# Patient Record
Sex: FEMALE | Race: BLACK OR AFRICAN AMERICAN | Employment: UNEMPLOYED | URBAN - METROPOLITAN AREA
[De-identification: names, ages, dates, MRNs, and addresses within clinical notes are randomized per-mention and may not be internally consistent; named-entity substitution may affect disease eponyms.]

---
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
## 2023-01-03 ENCOUNTER — ROUTINE PRENATAL (OUTPATIENT)
Dept: OBGYN | Age: 18
End: 2023-01-03
Payer: COMMERCIAL

## 2023-01-03 ENCOUNTER — HOSPITAL ENCOUNTER (OUTPATIENT)
Age: 18
Setting detail: SPECIMEN
Discharge: HOME | End: 2023-01-03

## 2023-01-03 VITALS
HEART RATE: 93 BPM | DIASTOLIC BLOOD PRESSURE: 70 MMHG | WEIGHT: 145 LBS | BODY MASS INDEX: 30.31 KG/M2 | SYSTOLIC BLOOD PRESSURE: 116 MMHG

## 2023-01-03 DIAGNOSIS — R82.71 GBS BACTERIURIA: ICD-10-CM

## 2023-01-03 DIAGNOSIS — Z3A.27 27 WEEKS GESTATION OF PREGNANCY: ICD-10-CM

## 2023-01-03 DIAGNOSIS — Z3A.27 27 WEEKS GESTATION OF PREGNANCY: Primary | ICD-10-CM

## 2023-01-03 LAB
ABSOLUTE EOS #: 0.12 K/UL (ref 0–0.44)
ABSOLUTE IMMATURE GRANULOCYTE: 0.03 K/UL (ref 0–0.3)
ABSOLUTE LYMPH #: 1.64 K/UL (ref 1.2–5.2)
ABSOLUTE MONO #: 0.54 K/UL (ref 0.1–1.4)
BASOPHILS # BLD: 0 % (ref 0–2)
BASOPHILS ABSOLUTE: <0.03 K/UL (ref 0–0.2)
EOSINOPHILS RELATIVE PERCENT: 2 % (ref 1–4)
GLUCOSE ADMINISTRATION: ABNORMAL
GLUCOSE TOLERANCE SCREEN 50G: 146 MG/DL (ref 70–135)
HCT VFR BLD CALC: 34.9 % (ref 36.3–47.1)
HEMOGLOBIN: 11.5 G/DL (ref 11.9–15.1)
IMMATURE GRANULOCYTES: 1 %
LYMPHOCYTES # BLD: 26 % (ref 25–45)
MCH RBC QN AUTO: 31.3 PG (ref 25.2–33.5)
MCHC RBC AUTO-ENTMCNC: 33 G/DL (ref 28.4–34.8)
MCV RBC AUTO: 94.8 FL (ref 82.6–102.9)
MONOCYTES # BLD: 8 % (ref 2–8)
NRBC AUTOMATED: 0 PER 100 WBC
PDW BLD-RTO: 12.8 % (ref 11.8–14.4)
PLATELET # BLD: 308 K/UL (ref 138–453)
PMV BLD AUTO: 9.7 FL (ref 8.1–13.5)
RBC # BLD: 3.68 M/UL (ref 3.95–5.11)
SEG NEUTROPHILS: 63 % (ref 34–64)
SEGMENTED NEUTROPHILS ABSOLUTE COUNT: 4.05 K/UL (ref 1.8–8)
WBC # BLD: 6.4 K/UL (ref 4.5–13.5)

## 2023-01-03 PROCEDURE — 99213 OFFICE O/P EST LOW 20 MIN: CPT

## 2023-01-03 PROCEDURE — 90715 TDAP VACCINE 7 YRS/> IM: CPT | Performed by: OBSTETRICS & GYNECOLOGY

## 2023-01-03 PROCEDURE — G8484 FLU IMMUNIZE NO ADMIN: HCPCS

## 2023-01-03 PROCEDURE — G8427 DOCREV CUR MEDS BY ELIG CLIN: HCPCS

## 2023-01-03 PROCEDURE — 1036F TOBACCO NON-USER: CPT

## 2023-01-03 PROCEDURE — 99211 OFF/OP EST MAY X REQ PHY/QHP: CPT

## 2023-01-03 PROCEDURE — G8419 CALC BMI OUT NRM PARAM NOF/U: HCPCS

## 2023-01-03 RX ORDER — ONDANSETRON 4 MG/1
4 TABLET, ORALLY DISINTEGRATING ORAL 3 TIMES DAILY PRN
Qty: 21 TABLET | Refills: 0 | Status: SHIPPED | OUTPATIENT
Start: 2023-01-03

## 2023-01-03 NOTE — PROGRESS NOTES
Prenatal Visit    Priyanka Dodd is a 21 y.o. female  at 27w3d    The patient was seen and evaluated. She complains of intermittent nausea. She reports positive fetal movements. She denies contractions, vaginal bleeding and leakage of fluid. Signs and symptoms of labor and pre-eclampsia were reviewed with the patient in detail. She is to report any of these if they occur. She currently denies any of these. The patient is Rh + and Rhogam is not indicated in this pregnancy. The patient is requesting the T-Dap Vaccine (27-36 weeks) this pregnancy and will receive today. The patient declined the influenza vaccine this year. The patient declined the COVID-19 vaccine this year. The problem list reflects the active issues addressed during today's visit    Vitals:  Vitals:    23 1008   BP: 116/70   Site: Right Upper Arm   Position: Sitting   Cuff Size: Medium Adult   Pulse: 93   Weight: 145 lb (65.8 kg)       BP: 116/70  Weight: 145 lb (65.8 kg)  Heart Rate: 93  Albumin: Negative  Glucose: Negative  Fundal Height (cm): 27 cm  Fetal HR: 155  Movement: Present     28 week labs: .  1hr GTT: ordered today   28 week CBC:   Lab Results   Component Value Date    WBC Unable to perform testing: No specimen received. 10/18/2022    HGB Unable to perform testing: No specimen received. 10/18/2022    HCT Unable to perform testing: No specimen received. 10/18/2022    MCV Unable to perform testing: No specimen received. 10/18/2022    PLT Unable to perform testing: No specimen received.  10/18/2022     UA w/ Ur C&S: ordered today     Assessment & Plan:  Priyanka Dodd is a 21 y.o. female  at 27w3d   - 29 week labs ordered   - Tdap vaccination: is requesting    - Influenza vaccination: declined    - Rhogam: is not indicated in this pregnancy   - COVID-19- patient declined today   -  testing indication starting 32 weeks GA:  scheduled for MFM appt 22   -Indications for  section: none at this time   - Warning signs reviewed and recommendations when to call or present to the hospital if she experiences signs or symptoms of  labor and pre-eclampsia were reviewed. - The patient was instructed on fetal kick counts. She was instructed that the baby should move at a minimum of ten times within one hour after a meal. The patient was instructed to lay down on her left side twenty minutes after eating and count movements for up to one hour with a target value of ten movements. She was instructed to notify the office if she did not make that target after two attempts or if after any attempt there was less than four movements. - Patient desire nexplanon PP for dysmenorrhea     Patient Active Problem List    Diagnosis Date Noted    Acute pyelonephritis in second trimester 10/17/2022     Priority: Medium     Culture: klebsiella pneumoniae. Prescribed Keflex 500mg four times daily x7 days, 500mg Keflex once a day for suppression  22:patient reports not taking suppression therapy. Rx re-sent to pharmacy. Repeat UCx ordered      Klebsiella pneumoniae UTI 10/17/2022     Priority: Medium     10/17/22: Pyelo. Prescribed 500mg Keflex four times daily, x7 days.       Short interval between pregnancies affecting pregnancy, antepartum 2022     G2: 22  G3: SELVIN 23      Recent former cigar smoker 2022: Patient reports she quit smoking Black and Mild cigars when she learned of pregnancy, 2022      Hx Gonorrhea/Chlamydia 2022     Chlamydia  18: positive, treated with IV antibiotic    10/29/19: positive  20: negative    21: positive  21: negative  10/17/22: negative    Gonorrhea  20: positive  02/15/21: negative  10/17/22: negative      Pregravid BMI 25.23 2022: Recommended weight gain of 15 to 25 pounds; patient verbalized understanding      High risk pregnancy, antepartum 2022     Fetal testing indicated: No  Fetal testing indication: N/A  Fetal testing initiation: N/A  Fetal testing frequency: N/A      Dilatation of pulmonic artery       1/12/22: Patient was admitted to the hospital for positive COVID with chest pain and SOB              - Work up at that time showed dilation of pulmonary artery on CT, however WINSTON could not be done due to positive COVID               - Echo performed showing EF 53%, no significant valvular regurgitation or stenosis seen, pulmonic valve not well visualized but doppler velocities normal              - Cardiology recommended outpatient follow up with WINSTON after COVID resolved              - Patient reports being unaware of diagnosis and has not followed up with cardiology              - VSS              - Denies any SOB or chest pain at this time    Patient was to have followed up with cardiology after delivery; no followup noted in chart      Hx gHTN (G1) 11/22/2021    Hx FGR (G1, G2) 11/22/2021     G1: IoL IUGR  G2: IoL IUGR 38w4d      Late prenatal care 10/11/2021     G3: 20w4d at OB intake      Family H/O CHD 06/11/2018     Two half  sisters  Same mother but different fathers      THC use  02/13/2018     10/17/22: UDS positive. 11/16/22: Patient reports smoking marijuana \"every other day. \" Patient was counseled not recommended in pregnancy; maternal/fetal risks reviewed. Patient verbalizes understanding. Return in about 4 weeks (around 1/31/2023) for Routine OB Visit. Rashid Ontiveros MD  Ob/Gyn Resident  [facility], [place]  1/3/2023, 10:32 AM        Attending Physician Statement  I have personally discussed the care of Mica Lopez, including pertinent history and exam findings with the resident. I have reviewed and edited their note in the electronic medical record as indicated. The key elements of all parts of the encounter have been performed/reviewed by me.   I agree with the assessment, plan and orders as documented by the resident.        Attending's Name:  Shweta Armendariz MD

## 2023-01-04 PROBLEM — R82.71 GBS BACTERIURIA: Status: ACTIVE | Noted: 2023-01-04

## 2023-01-04 LAB
CULTURE: ABNORMAL
SPECIMEN DESCRIPTION: ABNORMAL

## 2023-01-05 DIAGNOSIS — Z3A.27 27 WEEKS GESTATION OF PREGNANCY: Primary | ICD-10-CM

## 2023-01-05 PROBLEM — R73.09 GLUCOSE TOLERANCE TEST ABNORMAL: Status: ACTIVE | Noted: 2023-01-05

## 2023-01-25 ENCOUNTER — APPOINTMENT (OUTPATIENT)
Dept: CT IMAGING | Age: 18
End: 2023-01-25
Payer: COMMERCIAL

## 2023-01-25 ENCOUNTER — HOSPITAL ENCOUNTER (OUTPATIENT)
Age: 18
Discharge: HOME | End: 2023-01-25
Attending: EMERGENCY MEDICINE | Admitting: OBSTETRICS & GYNECOLOGY
Payer: COMMERCIAL

## 2023-01-25 VITALS
SYSTOLIC BLOOD PRESSURE: 118 MMHG | BODY MASS INDEX: 27.29 KG/M2 | DIASTOLIC BLOOD PRESSURE: 67 MMHG | OXYGEN SATURATION: 100 % | RESPIRATION RATE: 16 BRPM | TEMPERATURE: 98.1 F | WEIGHT: 130 LBS | HEART RATE: 73 BPM | HEIGHT: 58 IN
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
## 2023-01-25 DIAGNOSIS — Z3A.30 30 WEEKS GESTATION OF PREGNANCY: ICD-10-CM

## 2023-01-25 LAB
ANION GAP SERPL CALCULATED.3IONS-SCNC: 12 MMOL/L (ref 9–17)
BUN BLDV-MCNC: 7 MG/DL (ref 6–20)
CALCIUM SERPL-MCNC: 8.9 MG/DL (ref 8.6–10.4)
CHLORIDE BLD-SCNC: 104 MMOL/L (ref 98–107)
CO2: 18 MMOL/L (ref 20–31)
CREAT SERPL-MCNC: 0.48 MG/DL (ref 0.5–0.9)
GFR SERPL CREATININE-BSD FRML MDRD: >60 ML/MIN/1.73M2
GLUCOSE BLD-MCNC: 72 MG/DL (ref 70–99)
POTASSIUM SERPL-SCNC: 3.6 MMOL/L (ref 3.7–5.3)
SODIUM BLD-SCNC: 134 MMOL/L (ref 135–144)

## 2023-01-25 PROCEDURE — 70486 CT MAXILLOFACIAL W/O DYE: CPT

## 2023-01-25 PROCEDURE — 70498 CT ANGIOGRAPHY NECK: CPT

## 2023-01-25 PROCEDURE — 99213 OFFICE O/P EST LOW 20 MIN: CPT

## 2023-01-25 PROCEDURE — G0378 HOSPITAL OBSERVATION PER HR: HCPCS

## 2023-01-25 PROCEDURE — 80048 BASIC METABOLIC PNL TOTAL CA: CPT

## 2023-01-25 PROCEDURE — 99285 EMERGENCY DEPT VISIT HI MDM: CPT

## 2023-01-25 PROCEDURE — 6360000004 HC RX CONTRAST MEDICATION: Performed by: STUDENT IN AN ORGANIZED HEALTH CARE EDUCATION/TRAINING PROGRAM

## 2023-01-25 PROCEDURE — 6370000000 HC RX 637 (ALT 250 FOR IP): Performed by: STUDENT IN AN ORGANIZED HEALTH CARE EDUCATION/TRAINING PROGRAM

## 2023-01-25 RX ORDER — ACETAMINOPHEN 500 MG
1000 TABLET ORAL ONCE
Status: COMPLETED | OUTPATIENT
Start: 2023-01-25 | End: 2023-01-25

## 2023-01-25 RX ADMIN — IOPAMIDOL 90 ML: 755 INJECTION, SOLUTION INTRAVENOUS at 19:26

## 2023-01-25 RX ADMIN — ACETAMINOPHEN 1000 MG: 500 TABLET ORAL at 18:32

## 2023-01-25 ASSESSMENT — ENCOUNTER SYMPTOMS
WHEEZING: 0
ABDOMINAL PAIN: 1
CHEST TIGHTNESS: 0
VOMITING: 0
COUGH: 0
NAUSEA: 0
SHORTNESS OF BREATH: 0
DIARRHEA: 0

## 2023-01-25 ASSESSMENT — PAIN DESCRIPTION - ORIENTATION: ORIENTATION: LEFT

## 2023-01-25 ASSESSMENT — PAIN SCALES - GENERAL
PAINLEVEL_OUTOF10: 6
PAINLEVEL_OUTOF10: 10

## 2023-01-25 ASSESSMENT — PAIN - FUNCTIONAL ASSESSMENT: PAIN_FUNCTIONAL_ASSESSMENT: 0-10

## 2023-01-25 ASSESSMENT — PAIN DESCRIPTION - LOCATION: LOCATION: HEAD
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
## 2023-01-26 NOTE — FLOWSHEET NOTE
Pt okay for discharge per Dr Wanda Ortiz. Discharge instructions given, all questions answered. Pt d/c via self with all belongings.
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
## 2023-01-31 ENCOUNTER — ROUTINE PRENATAL (OUTPATIENT)
Dept: PERINATAL CARE | Age: 18
End: 2023-01-31
Payer: COMMERCIAL

## 2023-01-31 ENCOUNTER — ROUTINE PRENATAL (OUTPATIENT)
Dept: OBGYN | Age: 18
End: 2023-01-31
Payer: COMMERCIAL

## 2023-01-31 VITALS
BODY MASS INDEX: 30.02 KG/M2 | RESPIRATION RATE: 16 BRPM | SYSTOLIC BLOOD PRESSURE: 112 MMHG | DIASTOLIC BLOOD PRESSURE: 72 MMHG | WEIGHT: 143 LBS | HEIGHT: 58 IN | TEMPERATURE: 97.2 F | HEART RATE: 84 BPM

## 2023-01-31 VITALS
HEART RATE: 87 BPM | DIASTOLIC BLOOD PRESSURE: 67 MMHG | BODY MASS INDEX: 30.1 KG/M2 | WEIGHT: 144 LBS | SYSTOLIC BLOOD PRESSURE: 108 MMHG

## 2023-01-31 DIAGNOSIS — Z3A.31 31 WEEKS GESTATION OF PREGNANCY: Primary | ICD-10-CM

## 2023-01-31 DIAGNOSIS — Z87.59 HISTORY OF GESTATIONAL HYPERTENSION: ICD-10-CM

## 2023-01-31 DIAGNOSIS — O99.810 ABNORMAL MATERNAL GLUCOSE TOLERANCE, ANTEPARTUM: ICD-10-CM

## 2023-01-31 DIAGNOSIS — Z36.4 ULTRASOUND FOR ANTENATAL SCREENING FOR FETAL GROWTH RESTRICTION: ICD-10-CM

## 2023-01-31 DIAGNOSIS — R82.71 GBS BACTERIURIA: ICD-10-CM

## 2023-01-31 DIAGNOSIS — R73.09 GLUCOSE TOLERANCE TEST ABNORMAL: ICD-10-CM

## 2023-01-31 DIAGNOSIS — O36.5930 INTRAUTERINE GROWTH RESTRICTION (IUGR) AFFECTING CARE OF MOTHER, THIRD TRIMESTER, SINGLE GESTATION: ICD-10-CM

## 2023-01-31 DIAGNOSIS — Z13.89 ENCOUNTER FOR ROUTINE SCREENING FOR MALFORMATION USING ULTRASONICS: ICD-10-CM

## 2023-01-31 DIAGNOSIS — O09.33 INSUFFICIENT PRENATAL CARE IN THIRD TRIMESTER: ICD-10-CM

## 2023-01-31 DIAGNOSIS — I28.8 DILATATION OF PULMONIC ARTERY (HCC): ICD-10-CM

## 2023-01-31 DIAGNOSIS — O09.899 SHORT INTERVAL BETWEEN PREGNANCIES COMPLICATING PREGNANCY, ANTEPARTUM: ICD-10-CM

## 2023-01-31 DIAGNOSIS — O35.2XX0 HEREDITARY FAMILIAL DISEASE AFFECTING MANAGEMENT OF MOTHER AND POSSIBLY AFFECTING FETUS, ANTEPARTUM, SINGLE OR UNSPECIFIED FETUS: Primary | ICD-10-CM

## 2023-01-31 DIAGNOSIS — O09.293 HISTORY OF INTRAUTERINE GROWTH RESTRICTION IN PRIOR PREGNANCY, CURRENTLY PREGNANT, THIRD TRIMESTER: ICD-10-CM

## 2023-01-31 DIAGNOSIS — Z3A.31 31 WEEKS GESTATION OF PREGNANCY: ICD-10-CM

## 2023-01-31 PROBLEM — O36.5990 IUGR (INTRAUTERINE GROWTH RESTRICTION) AFFECTING CARE OF MOTHER: Status: RESOLVED | Noted: 2022-02-22 | Resolved: 2023-01-31

## 2023-01-31 PROCEDURE — 93325 DOPPLER ECHO COLOR FLOW MAPG: CPT | Performed by: OBSTETRICS & GYNECOLOGY

## 2023-01-31 PROCEDURE — 99211 OFF/OP EST MAY X REQ PHY/QHP: CPT

## 2023-01-31 PROCEDURE — G8427 DOCREV CUR MEDS BY ELIG CLIN: HCPCS

## 2023-01-31 PROCEDURE — 76820 UMBILICAL ARTERY ECHO: CPT | Performed by: OBSTETRICS & GYNECOLOGY

## 2023-01-31 PROCEDURE — 76819 FETAL BIOPHYS PROFIL W/O NST: CPT | Performed by: OBSTETRICS & GYNECOLOGY

## 2023-01-31 PROCEDURE — 76821 MIDDLE CEREBRAL ARTERY ECHO: CPT | Performed by: OBSTETRICS & GYNECOLOGY

## 2023-01-31 PROCEDURE — 76805 OB US >/= 14 WKS SNGL FETUS: CPT | Performed by: OBSTETRICS & GYNECOLOGY

## 2023-01-31 PROCEDURE — 76827 ECHO EXAM OF FETAL HEART: CPT | Performed by: OBSTETRICS & GYNECOLOGY

## 2023-01-31 PROCEDURE — G8484 FLU IMMUNIZE NO ADMIN: HCPCS

## 2023-01-31 PROCEDURE — 1036F TOBACCO NON-USER: CPT

## 2023-01-31 PROCEDURE — 99213 OFFICE O/P EST LOW 20 MIN: CPT

## 2023-01-31 PROCEDURE — G8419 CALC BMI OUT NRM PARAM NOF/U: HCPCS

## 2023-01-31 PROCEDURE — 76825 ECHO EXAM OF FETAL HEART: CPT | Performed by: OBSTETRICS & GYNECOLOGY

## 2023-01-31 PROCEDURE — 99999 PR OFFICE/OUTPT VISIT,PROCEDURE ONLY: CPT | Performed by: OBSTETRICS & GYNECOLOGY

## 2023-01-31 RX ORDER — ONDANSETRON 4 MG/1
4 TABLET, FILM COATED ORAL 3 TIMES DAILY PRN
Qty: 270 TABLET | Refills: 0 | Status: SHIPPED | OUTPATIENT
Start: 2023-01-31 | End: 2023-05-01
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
## 2023-02-14 ENCOUNTER — TELEPHONE (OUTPATIENT)
Dept: OBGYN | Age: 18
End: 2023-02-14

## 2023-02-14 NOTE — TELEPHONE ENCOUNTER
Patient no showed for 2/14/2023 OB appointment at [address] office. Writer unable to leave a voice message for patient to call the office to reschedule appointment.

## 2023-02-22 ENCOUNTER — ROUTINE PRENATAL (OUTPATIENT)
Dept: PERINATAL CARE | Age: 18
End: 2023-02-22
Payer: COMMERCIAL

## 2023-02-22 VITALS
TEMPERATURE: 97.9 F | DIASTOLIC BLOOD PRESSURE: 63 MMHG | RESPIRATION RATE: 16 BRPM | SYSTOLIC BLOOD PRESSURE: 121 MMHG | HEART RATE: 87 BPM | WEIGHT: 142 LBS | BODY MASS INDEX: 29.81 KG/M2 | HEIGHT: 58 IN

## 2023-02-22 DIAGNOSIS — O36.5930 INTRAUTERINE GROWTH RESTRICTION (IUGR) AFFECTING CARE OF MOTHER, THIRD TRIMESTER, SINGLE GESTATION: Primary | ICD-10-CM

## 2023-02-22 DIAGNOSIS — O09.899 SHORT INTERVAL BETWEEN PREGNANCIES COMPLICATING PREGNANCY, ANTEPARTUM: ICD-10-CM

## 2023-02-22 DIAGNOSIS — Z36.4 ULTRASOUND FOR ANTENATAL SCREENING FOR FETAL GROWTH RESTRICTION: ICD-10-CM

## 2023-02-22 DIAGNOSIS — Z3A.34 34 WEEKS GESTATION OF PREGNANCY: ICD-10-CM

## 2023-02-22 DIAGNOSIS — O09.293 HISTORY OF INTRAUTERINE GROWTH RESTRICTION IN PRIOR PREGNANCY, CURRENTLY PREGNANT, THIRD TRIMESTER: ICD-10-CM

## 2023-02-22 DIAGNOSIS — O99.810 ABNORMAL MATERNAL GLUCOSE TOLERANCE, ANTEPARTUM: ICD-10-CM

## 2023-02-22 DIAGNOSIS — O09.33 INSUFFICIENT PRENATAL CARE IN THIRD TRIMESTER: ICD-10-CM

## 2023-02-22 PROCEDURE — 76819 FETAL BIOPHYS PROFIL W/O NST: CPT | Performed by: OBSTETRICS & GYNECOLOGY

## 2023-02-22 PROCEDURE — 76821 MIDDLE CEREBRAL ARTERY ECHO: CPT | Performed by: OBSTETRICS & GYNECOLOGY

## 2023-02-22 PROCEDURE — 76820 UMBILICAL ARTERY ECHO: CPT | Performed by: OBSTETRICS & GYNECOLOGY

## 2023-02-22 PROCEDURE — 76816 OB US FOLLOW-UP PER FETUS: CPT | Performed by: OBSTETRICS & GYNECOLOGY

## 2023-03-01 ENCOUNTER — ROUTINE PRENATAL (OUTPATIENT)
Dept: PERINATAL CARE | Age: 18
End: 2023-03-01
Payer: COMMERCIAL

## 2023-03-01 VITALS
SYSTOLIC BLOOD PRESSURE: 110 MMHG | HEART RATE: 93 BPM | BODY MASS INDEX: 30.23 KG/M2 | WEIGHT: 144 LBS | DIASTOLIC BLOOD PRESSURE: 62 MMHG | HEIGHT: 58 IN

## 2023-03-01 DIAGNOSIS — O99.810 ABNORMAL MATERNAL GLUCOSE TOLERANCE, ANTEPARTUM: ICD-10-CM

## 2023-03-01 DIAGNOSIS — O36.5930 INTRAUTERINE GROWTH RESTRICTION (IUGR) AFFECTING CARE OF MOTHER, THIRD TRIMESTER, SINGLE GESTATION: Primary | ICD-10-CM

## 2023-03-01 DIAGNOSIS — O43.103 PLACENTAL ABNORMALITY IN THIRD TRIMESTER: ICD-10-CM

## 2023-03-01 DIAGNOSIS — O09.899 SHORT INTERVAL BETWEEN PREGNANCIES COMPLICATING PREGNANCY, ANTEPARTUM: ICD-10-CM

## 2023-03-01 DIAGNOSIS — Z3A.35 35 WEEKS GESTATION OF PREGNANCY: ICD-10-CM

## 2023-03-01 DIAGNOSIS — O09.33 INSUFFICIENT PRENATAL CARE IN THIRD TRIMESTER: ICD-10-CM

## 2023-03-01 PROCEDURE — 76821 MIDDLE CEREBRAL ARTERY ECHO: CPT | Performed by: OBSTETRICS & GYNECOLOGY

## 2023-03-01 PROCEDURE — 99999 PR OFFICE/OUTPT VISIT,PROCEDURE ONLY: CPT | Performed by: OBSTETRICS & GYNECOLOGY

## 2023-03-01 PROCEDURE — 76819 FETAL BIOPHYS PROFIL W/O NST: CPT | Performed by: OBSTETRICS & GYNECOLOGY

## 2023-03-01 PROCEDURE — 76815 OB US LIMITED FETUS(S): CPT | Performed by: OBSTETRICS & GYNECOLOGY

## 2023-03-01 PROCEDURE — 76820 UMBILICAL ARTERY ECHO: CPT | Performed by: OBSTETRICS & GYNECOLOGY

## 2023-03-13 ENCOUNTER — HOSPITAL ENCOUNTER (INPATIENT)
Age: 18
LOS: 2 days | Discharge: HOME | End: 2023-03-15
Attending: OBSTETRICS & GYNECOLOGY | Admitting: OBSTETRICS & GYNECOLOGY
Payer: COMMERCIAL

## 2023-03-13 ENCOUNTER — ANESTHESIA (OUTPATIENT)
Dept: LABOR AND DELIVERY | Age: 18
End: 2023-03-13
Payer: COMMERCIAL

## 2023-03-13 ENCOUNTER — ANESTHESIA EVENT (OUTPATIENT)
Dept: LABOR AND DELIVERY | Age: 18
End: 2023-03-13
Payer: COMMERCIAL

## 2023-03-13 ENCOUNTER — ROUTINE PRENATAL (OUTPATIENT)
Dept: PERINATAL CARE | Age: 18
End: 2023-03-13
Payer: COMMERCIAL

## 2023-03-13 VITALS
DIASTOLIC BLOOD PRESSURE: 68 MMHG | WEIGHT: 148 LBS | TEMPERATURE: 97.8 F | HEART RATE: 88 BPM | HEIGHT: 58 IN | SYSTOLIC BLOOD PRESSURE: 112 MMHG | BODY MASS INDEX: 31.07 KG/M2 | RESPIRATION RATE: 16 BRPM

## 2023-03-13 DIAGNOSIS — O09.899 SHORT INTERVAL BETWEEN PREGNANCIES COMPLICATING PREGNANCY, ANTEPARTUM: ICD-10-CM

## 2023-03-13 DIAGNOSIS — O36.5930 INTRAUTERINE GROWTH RESTRICTION (IUGR) AFFECTING CARE OF MOTHER, THIRD TRIMESTER, SINGLE GESTATION: Primary | ICD-10-CM

## 2023-03-13 DIAGNOSIS — O09.33 INSUFFICIENT PRENATAL CARE IN THIRD TRIMESTER: ICD-10-CM

## 2023-03-13 DIAGNOSIS — O99.810 ABNORMAL MATERNAL GLUCOSE TOLERANCE, ANTEPARTUM: ICD-10-CM

## 2023-03-13 DIAGNOSIS — Z3A.37 37 WEEKS GESTATION OF PREGNANCY: ICD-10-CM

## 2023-03-13 DIAGNOSIS — Z36.4 ULTRASOUND FOR ANTENATAL SCREENING FOR FETAL GROWTH RESTRICTION: ICD-10-CM

## 2023-03-13 PROBLEM — Z97.5 IUD (INTRAUTERINE DEVICE) IN PLACE: Status: ACTIVE | Noted: 2023-03-13

## 2023-03-13 LAB
ABDOMINAL CIRCUMFERENCE: NORMAL
ABO/RH: NORMAL
ABSOLUTE EOS #: 0.14 K/UL (ref 0–0.44)
ABSOLUTE IMMATURE GRANULOCYTE: 0.06 K/UL (ref 0–0.3)
ABSOLUTE LYMPH #: 1.89 K/UL (ref 1.2–5.2)
ABSOLUTE MONO #: 0.65 K/UL (ref 0.1–1.4)
ANTIBODY SCREEN: NEGATIVE
ARM BAND NUMBER: NORMAL
BASOPHILS # BLD: 1 % (ref 0–2)
BASOPHILS ABSOLUTE: 0.04 K/UL (ref 0–0.2)
BIPARIETAL DIAMETER: NORMAL
EOSINOPHILS RELATIVE PERCENT: 2 % (ref 1–4)
ESTIMATED FETAL WEIGHT: NORMAL
EXPIRATION DATE: NORMAL
FEMORAL DIAMETER: NORMAL
HC/AC: NORMAL
HCT VFR BLD AUTO: 34.7 % (ref 36.3–47.1)
HEAD CIRCUMFERENCE: NORMAL
HGB BLD-MCNC: 11.7 G/DL (ref 11.9–15.1)
IMMATURE GRANULOCYTES: 1 %
LYMPHOCYTES # BLD: 27 % (ref 25–45)
MCH RBC QN AUTO: 30.5 PG (ref 25.2–33.5)
MCHC RBC AUTO-ENTMCNC: 33.7 G/DL (ref 28.4–34.8)
MCV RBC AUTO: 90.6 FL (ref 82.6–102.9)
MONOCYTES # BLD: 9 % (ref 2–8)
NRBC AUTOMATED: 0 PER 100 WBC
PDW BLD-RTO: 12.6 % (ref 11.8–14.4)
PLATELET # BLD AUTO: 282 K/UL (ref 138–453)
PMV BLD AUTO: 9 FL (ref 8.1–13.5)
RBC # BLD: 3.83 M/UL (ref 3.95–5.11)
SEG NEUTROPHILS: 60 % (ref 34–64)
SEGMENTED NEUTROPHILS ABSOLUTE COUNT: 4.13 K/UL (ref 1.8–8)
T PALLIDUM AB SER QL IA: NONREACTIVE
WBC # BLD AUTO: 6.9 K/UL (ref 4.5–13.5)

## 2023-03-13 PROCEDURE — 85025 COMPLETE CBC W/AUTO DIFF WBC: CPT

## 2023-03-13 PROCEDURE — 76821 MIDDLE CEREBRAL ARTERY ECHO: CPT | Performed by: OBSTETRICS & GYNECOLOGY

## 2023-03-13 PROCEDURE — 6360000002 HC RX W HCPCS

## 2023-03-13 PROCEDURE — 99245 OFF/OP CONSLTJ NEW/EST HI 55: CPT | Performed by: OBSTETRICS & GYNECOLOGY

## 2023-03-13 PROCEDURE — 2500000003 HC RX 250 WO HCPCS: Performed by: NURSE ANESTHETIST, CERTIFIED REGISTERED

## 2023-03-13 PROCEDURE — 6370000000 HC RX 637 (ALT 250 FOR IP): Performed by: STUDENT IN AN ORGANIZED HEALTH CARE EDUCATION/TRAINING PROGRAM

## 2023-03-13 PROCEDURE — 0UH97HZ INSERTION OF CONTRACEPTIVE DEVICE INTO UTERUS, VIA NATURAL OR ARTIFICIAL OPENING: ICD-10-PCS | Performed by: OBSTETRICS & GYNECOLOGY

## 2023-03-13 PROCEDURE — 86901 BLOOD TYPING SEROLOGIC RH(D): CPT

## 2023-03-13 PROCEDURE — 86780 TREPONEMA PALLIDUM: CPT

## 2023-03-13 PROCEDURE — 3700000025 EPIDURAL BLOCK: Performed by: ANESTHESIOLOGY

## 2023-03-13 PROCEDURE — G8427 DOCREV CUR MEDS BY ELIG CLIN: HCPCS | Performed by: OBSTETRICS & GYNECOLOGY

## 2023-03-13 PROCEDURE — G8484 FLU IMMUNIZE NO ADMIN: HCPCS | Performed by: OBSTETRICS & GYNECOLOGY

## 2023-03-13 PROCEDURE — 76816 OB US FOLLOW-UP PER FETUS: CPT | Performed by: OBSTETRICS & GYNECOLOGY

## 2023-03-13 PROCEDURE — 86900 BLOOD TYPING SEROLOGIC ABO: CPT

## 2023-03-13 PROCEDURE — 51701 INSERT BLADDER CATHETER: CPT

## 2023-03-13 PROCEDURE — 1220000000 HC SEMI PRIVATE OB R&B

## 2023-03-13 PROCEDURE — 6360000002 HC RX W HCPCS: Performed by: STUDENT IN AN ORGANIZED HEALTH CARE EDUCATION/TRAINING PROGRAM

## 2023-03-13 PROCEDURE — 6360000002 HC RX W HCPCS: Performed by: ANESTHESIOLOGY

## 2023-03-13 PROCEDURE — 6370000000 HC RX 637 (ALT 250 FOR IP)

## 2023-03-13 PROCEDURE — 2580000003 HC RX 258

## 2023-03-13 PROCEDURE — 76820 UMBILICAL ARTERY ECHO: CPT | Performed by: OBSTETRICS & GYNECOLOGY

## 2023-03-13 PROCEDURE — G8419 CALC BMI OUT NRM PARAM NOF/U: HCPCS | Performed by: OBSTETRICS & GYNECOLOGY

## 2023-03-13 PROCEDURE — 6360000002 HC RX W HCPCS: Performed by: NURSE ANESTHETIST, CERTIFIED REGISTERED

## 2023-03-13 PROCEDURE — 86850 RBC ANTIBODY SCREEN: CPT

## 2023-03-13 PROCEDURE — 76819 FETAL BIOPHYS PROFIL W/O NST: CPT | Performed by: OBSTETRICS & GYNECOLOGY

## 2023-03-13 PROCEDURE — 7200000001 HC VAGINAL DELIVERY

## 2023-03-13 RX ORDER — SENNA AND DOCUSATE SODIUM 50; 8.6 MG/1; MG/1
1 TABLET, FILM COATED ORAL DAILY
Qty: 30 TABLET | Refills: 0 | Status: SHIPPED | OUTPATIENT
Start: 2023-03-13

## 2023-03-13 RX ORDER — SODIUM CHLORIDE 9 MG/ML
25 INJECTION, SOLUTION INTRAVENOUS PRN
Status: DISCONTINUED | OUTPATIENT
Start: 2023-03-13 | End: 2023-03-13

## 2023-03-13 RX ORDER — DOCUSATE SODIUM 100 MG/1
100 CAPSULE, LIQUID FILLED ORAL 2 TIMES DAILY
Status: DISCONTINUED | OUTPATIENT
Start: 2023-03-13 | End: 2023-03-15 | Stop reason: HOSPADM

## 2023-03-13 RX ORDER — SODIUM CHLORIDE 9 MG/ML
INJECTION, SOLUTION INTRAVENOUS PRN
Status: DISCONTINUED | OUTPATIENT
Start: 2023-03-13 | End: 2023-03-15 | Stop reason: HOSPADM

## 2023-03-13 RX ORDER — DIPHENHYDRAMINE HCL 25 MG
25 TABLET ORAL EVERY 4 HOURS PRN
Status: DISCONTINUED | OUTPATIENT
Start: 2023-03-13 | End: 2023-03-13

## 2023-03-13 RX ORDER — ONDANSETRON 2 MG/ML
4 INJECTION INTRAMUSCULAR; INTRAVENOUS EVERY 4 HOURS PRN
Status: DISCONTINUED | OUTPATIENT
Start: 2023-03-13 | End: 2023-03-15 | Stop reason: HOSPADM

## 2023-03-13 RX ORDER — IBUPROFEN 600 MG/1
600 TABLET ORAL EVERY 6 HOURS PRN
Qty: 30 TABLET | Refills: 1 | Status: SHIPPED | OUTPATIENT
Start: 2023-03-13

## 2023-03-13 RX ORDER — PROCHLORPERAZINE EDISYLATE 5 MG/ML
10 INJECTION INTRAMUSCULAR; INTRAVENOUS ONCE
Status: COMPLETED | OUTPATIENT
Start: 2023-03-13 | End: 2023-03-13

## 2023-03-13 RX ORDER — SODIUM CHLORIDE 0.9 % (FLUSH) 0.9 %
5-40 SYRINGE (ML) INJECTION EVERY 12 HOURS SCHEDULED
Status: DISCONTINUED | OUTPATIENT
Start: 2023-03-13 | End: 2023-03-13

## 2023-03-13 RX ORDER — SODIUM CHLORIDE, SODIUM LACTATE, POTASSIUM CHLORIDE, CALCIUM CHLORIDE 600; 310; 30; 20 MG/100ML; MG/100ML; MG/100ML; MG/100ML
INJECTION, SOLUTION INTRAVENOUS CONTINUOUS
Status: DISCONTINUED | OUTPATIENT
Start: 2023-03-13 | End: 2023-03-13

## 2023-03-13 RX ORDER — BISACODYL 10 MG
10 SUPPOSITORY, RECTAL RECTAL DAILY PRN
Status: DISCONTINUED | OUTPATIENT
Start: 2023-03-13 | End: 2023-03-15 | Stop reason: HOSPADM

## 2023-03-13 RX ORDER — SODIUM CHLORIDE, SODIUM LACTATE, POTASSIUM CHLORIDE, AND CALCIUM CHLORIDE .6; .31; .03; .02 G/100ML; G/100ML; G/100ML; G/100ML
1000 INJECTION, SOLUTION INTRAVENOUS PRN
Status: DISCONTINUED | OUTPATIENT
Start: 2023-03-13 | End: 2023-03-13

## 2023-03-13 RX ORDER — ACETAMINOPHEN 500 MG
1000 TABLET ORAL EVERY 6 HOURS
Status: DISCONTINUED | OUTPATIENT
Start: 2023-03-13 | End: 2023-03-15 | Stop reason: HOSPADM

## 2023-03-13 RX ORDER — ACETAMINOPHEN 500 MG
1000 TABLET ORAL EVERY 6 HOURS PRN
Status: DISCONTINUED | OUTPATIENT
Start: 2023-03-13 | End: 2023-03-13

## 2023-03-13 RX ORDER — SODIUM CHLORIDE, SODIUM LACTATE, POTASSIUM CHLORIDE, AND CALCIUM CHLORIDE .6; .31; .03; .02 G/100ML; G/100ML; G/100ML; G/100ML
500 INJECTION, SOLUTION INTRAVENOUS PRN
Status: DISCONTINUED | OUTPATIENT
Start: 2023-03-13 | End: 2023-03-13

## 2023-03-13 RX ORDER — CEPHALEXIN 500 MG/1
500 CAPSULE ORAL DAILY
Status: DISCONTINUED | OUTPATIENT
Start: 2023-03-13 | End: 2023-03-13

## 2023-03-13 RX ORDER — LIDOCAINE HYDROCHLORIDE AND EPINEPHRINE 15; 5 MG/ML; UG/ML
INJECTION, SOLUTION EPIDURAL PRN
Status: DISCONTINUED | OUTPATIENT
Start: 2023-03-13 | End: 2023-03-13 | Stop reason: SDUPTHER

## 2023-03-13 RX ORDER — NALOXONE HYDROCHLORIDE 0.4 MG/ML
INJECTION, SOLUTION INTRAMUSCULAR; INTRAVENOUS; SUBCUTANEOUS PRN
Status: DISCONTINUED | OUTPATIENT
Start: 2023-03-13 | End: 2023-03-13

## 2023-03-13 RX ORDER — ROPIVACAINE HYDROCHLORIDE 2 MG/ML
INJECTION, SOLUTION EPIDURAL; INFILTRATION; PERINEURAL
Status: COMPLETED
Start: 2023-03-13 | End: 2023-03-13

## 2023-03-13 RX ORDER — LIDOCAINE HYDROCHLORIDE 10 MG/ML
INJECTION, SOLUTION EPIDURAL; INFILTRATION; INTRACAUDAL; PERINEURAL PRN
Status: DISCONTINUED | OUTPATIENT
Start: 2023-03-13 | End: 2023-03-13 | Stop reason: SDUPTHER

## 2023-03-13 RX ORDER — SIMETHICONE 80 MG
80 TABLET,CHEWABLE ORAL EVERY 6 HOURS PRN
Status: DISCONTINUED | OUTPATIENT
Start: 2023-03-13 | End: 2023-03-15 | Stop reason: HOSPADM

## 2023-03-13 RX ORDER — IBUPROFEN 600 MG/1
600 TABLET ORAL EVERY 6 HOURS
Status: DISCONTINUED | OUTPATIENT
Start: 2023-03-13 | End: 2023-03-15 | Stop reason: HOSPADM

## 2023-03-13 RX ORDER — ROPIVACAINE HYDROCHLORIDE 2 MG/ML
INJECTION, SOLUTION EPIDURAL; INFILTRATION; PERINEURAL PRN
Status: DISCONTINUED | OUTPATIENT
Start: 2023-03-13 | End: 2023-03-13 | Stop reason: SDUPTHER

## 2023-03-13 RX ORDER — LANOLIN 72 %
OINTMENT (GRAM) TOPICAL PRN
Status: DISCONTINUED | OUTPATIENT
Start: 2023-03-13 | End: 2023-03-15 | Stop reason: HOSPADM

## 2023-03-13 RX ORDER — ONDANSETRON 2 MG/ML
4 INJECTION INTRAMUSCULAR; INTRAVENOUS EVERY 6 HOURS PRN
Status: DISCONTINUED | OUTPATIENT
Start: 2023-03-13 | End: 2023-03-13

## 2023-03-13 RX ORDER — SODIUM CHLORIDE 0.9 % (FLUSH) 0.9 %
5-40 SYRINGE (ML) INJECTION EVERY 12 HOURS SCHEDULED
Status: DISCONTINUED | OUTPATIENT
Start: 2023-03-13 | End: 2023-03-15 | Stop reason: HOSPADM

## 2023-03-13 RX ORDER — MORPHINE SULFATE 4 MG/ML
4 INJECTION, SOLUTION INTRAMUSCULAR; INTRAVENOUS ONCE
Status: COMPLETED | OUTPATIENT
Start: 2023-03-13 | End: 2023-03-13

## 2023-03-13 RX ORDER — MORPHINE SULFATE 10 MG/ML
6 INJECTION, SOLUTION INTRAMUSCULAR; INTRAVENOUS ONCE
Status: COMPLETED | OUTPATIENT
Start: 2023-03-13 | End: 2023-03-13

## 2023-03-13 RX ORDER — SODIUM CHLORIDE 0.9 % (FLUSH) 0.9 %
5-40 SYRINGE (ML) INJECTION PRN
Status: DISCONTINUED | OUTPATIENT
Start: 2023-03-13 | End: 2023-03-13

## 2023-03-13 RX ORDER — SODIUM CHLORIDE 0.9 % (FLUSH) 0.9 %
5-40 SYRINGE (ML) INJECTION PRN
Status: DISCONTINUED | OUTPATIENT
Start: 2023-03-13 | End: 2023-03-15 | Stop reason: HOSPADM

## 2023-03-13 RX ADMIN — IBUPROFEN 600 MG: 600 TABLET, FILM COATED ORAL at 23:41

## 2023-03-13 RX ADMIN — ROPIVACAINE HYDROCHLORIDE 4 ML: 2 INJECTION, SOLUTION EPIDURAL; INFILTRATION; PERINEURAL at 18:53

## 2023-03-13 RX ADMIN — Medication 166.7 ML: at 21:45

## 2023-03-13 RX ADMIN — ROPIVACAINE HYDROCHLORIDE 4 ML: 2 INJECTION, SOLUTION EPIDURAL; INFILTRATION; PERINEURAL at 18:58

## 2023-03-13 RX ADMIN — LEVONORGESTREL 1 EACH: 52 INTRAUTERINE DEVICE INTRAUTERINE at 21:46

## 2023-03-13 RX ADMIN — Medication 2.5 MILLION UNITS: at 17:02

## 2023-03-13 RX ADMIN — Medication 2.5 MILLION UNITS: at 21:26

## 2023-03-13 RX ADMIN — LIDOCAINE HYDROCHLORIDE,EPINEPHRINE BITARTRATE 3 ML: 15; .005 INJECTION, SOLUTION EPIDURAL; INFILTRATION; INTRACAUDAL; PERINEURAL at 18:48

## 2023-03-13 RX ADMIN — CEPHALEXIN 500 MG: 500 CAPSULE ORAL at 12:42

## 2023-03-13 RX ADMIN — SODIUM CHLORIDE 5 MILLION UNITS: 900 INJECTION INTRAVENOUS at 12:43

## 2023-03-13 RX ADMIN — ROPIVACAINE HYDROCHLORIDE 10 ML/HR: 2 INJECTION, SOLUTION EPIDURAL; INFILTRATION at 18:59

## 2023-03-13 RX ADMIN — PROCHLORPERAZINE EDISYLATE 10 MG: 5 INJECTION INTRAMUSCULAR; INTRAVENOUS at 14:29

## 2023-03-13 RX ADMIN — MORPHINE SULFATE 4 MG: 4 INJECTION INTRAVENOUS at 14:33

## 2023-03-13 RX ADMIN — Medication 1 MILLI-UNITS/MIN: at 14:56

## 2023-03-13 RX ADMIN — LIDOCAINE HYDROCHLORIDE 3 ML: 10 INJECTION, SOLUTION EPIDURAL; INFILTRATION; INTRACAUDAL; PERINEURAL at 18:44

## 2023-03-13 RX ADMIN — SODIUM CHLORIDE, POTASSIUM CHLORIDE, SODIUM LACTATE AND CALCIUM CHLORIDE: 600; 310; 30; 20 INJECTION, SOLUTION INTRAVENOUS at 12:26

## 2023-03-13 RX ADMIN — MORPHINE SULFATE 6 MG: 10 INJECTION INTRAVENOUS at 14:27

## 2023-03-13 ASSESSMENT — PAIN DESCRIPTION - LOCATION
LOCATION: ABDOMEN
LOCATION: ABDOMEN

## 2023-03-13 ASSESSMENT — PAIN SCALES - GENERAL
PAINLEVEL_OUTOF10: 8
PAINLEVEL_OUTOF10: 4

## 2023-03-13 ASSESSMENT — PAIN DESCRIPTION - DESCRIPTORS
DESCRIPTORS: CRAMPING
DESCRIPTORS: CRAMPING

## 2023-03-13 ASSESSMENT — LIFESTYLE VARIABLES: SMOKING_STATUS: 1

## 2023-03-13 NOTE — FLOWSHEET NOTE
Pt to L&D from [facility] for induction. Pt denies any leaking or bleeding. Pt states she has felt some cramping. Pt states positive fetal movement.

## 2023-03-13 NOTE — PROGRESS NOTES
Maternal Fetal Medicine   Ultrasound Interval Note    Bonita Hathaway is a 21 y.o. female  at 37w2d     Case discussed with Dr. Meng Epps. MFM Sono Report (Verbal): IUGR with EFW < 10th Percentile, Abnormal Umbilical and MCA Dopplers (increased)    Plan: Plan to send patient up to Labor and Delivery for Induction. Please refer to report for further details. Dr. Meng Epps updated, OB Residents updated,  RN updated.      Brandy Olsen DO  Ob/Gyn Resident  3/13/2023, 11:25 AM

## 2023-03-13 NOTE — FLOWSHEET NOTE
Tugged on tipton balloon and pulled out easily. Small bloody show noted. Becoming more uncomfortable.

## 2023-03-13 NOTE — ANESTHESIA PROCEDURE NOTES
Epidural Block    Patient location during procedure: OB  Reason for block: labor epidural  Staffing  Resident/CRNA: TASH Rowell - CRNA  Epidural  Patient position: sitting  Prep: Betadine and site prepped and draped  Patient monitoring: continuous pulse ox and frequent blood pressure checks  Approach: midline  Location: L2-3  Injection technique: TAMEKA air and TAMEKA saline  Provider prep: mask and sterile gloves  Needle  Needle type: Tuohy   Needle gauge: 17 G  Needle length: 3.5 in  Needle insertion depth: 5 cm  Catheter type: side hole  Catheter size: 19 G  Catheter at skin depth: 14 cm  Test dose: negativeCatheter Secured: tegaderm and tape  Assessment  Sensory level: T8  Hemodynamics: stable  Attempts: 1  Outcomes: uncomplicated  Preanesthetic Checklist  Completed: patient identified, IV checked, risks and benefits discussed, surgical/procedural consents, equipment checked, pre-op evaluation, timeout performed, anesthesia consent given, oxygen available, monitors applied/VS acknowledged and blood product R/B/A discussed and consented

## 2023-03-13 NOTE — FLOWSHEET NOTE
Castillo balloon placed per Dr Maddie Glover and inflated with [address] ml.'s sterile water. Tolerated well.

## 2023-03-13 NOTE — PROGRESS NOTES
Obstetric/Gynecology Resident Interval Note    Patient desires a postplacental IUD insertion for dysmenorrhea. She is known to have painful menses that interfere with her ADLs. When not pregnant, she has tried NSAIDS in the past without success. She wishes to continue to utilize barrier contraception for family planning and STD precautions. The side affect profile of the IUD was reviewed. All other forms of family planning and options for treatment of her dysmennorhea were reviewed with the patient. Consent obtained, placed in chart and Mirena IUD ordered.      Senior resident and attending updated and in agreement with plan    Dalila Fraga MD  OB/GYN Resident, PGY2  [facility]  3/13/2023, 6:47 PM

## 2023-03-13 NOTE — CARE COORDINATION
ANTEPARTUM NOTE    37 weeks gestation of pregnancy [Z3A.37]    Adri Damon was admitted to L&D on 3/13/2023 for IOL 2/2 IUGR @ 40 2/7    OB GYN Provider: [facility]    Will meet with patient after delivery to verify name/address/phone/insurance and discuss discharge planning. Anticipate DC home 2 nights after vaginal delivery or 4 nights after C/S delivery as long as hemodynamically stable.

## 2023-03-13 NOTE — DISCHARGE SUMMARY
Obstetric Discharge Summary  [facility]    Patient Name: Perlita Espinal  Patient : 2005  Primary Care Physician: No primary care provider on file. Admit Date: 3/13/2023    Principal Diagnosis: IUP at 37w2d, admitted for IOL 2/2 FGR (AC <10%ile) with abnormal dopplers     Her pregnancy has been complicated by:   Patient Active Problem List   Diagnosis    THC use     Family H/O CHD    Late prenatal care    Hx gHTN (G1)    Hx FGR (G1, G2, G3)    Dilatation of pulmonic artery     Acute pyelonephritis in second trimester    Klebsiella pneumoniae UTI    Short interval between pregnancies affecting pregnancy, antepartum    Recent former cigar smoker    Hx Gonorrhea/Chlamydia    Pregravid BMI 25.23    High risk pregnancy, antepartum    GBS bacteriuria    Elevated 1 hr, 3 hr **    37 weeks gestation of pregnancy     w/ IUD 3/13/23 F Apg 8/9 Wt 5#5    Mirena IUD in place       Infection Present?: No  Hospital Acquired: No    Surgical Operations & Procedures:  Analgesia: epidural  Delivery Type: Spontaneous Vaginal Delivery: See Labor and Delivery Summary   Laceration(s): Absent    Consultations: Anesthesia    Pertinent Findings & Procedures: Perlita Espinal is a 21 y.o. female  at 37w2d admitted for IOL 2/2 FGR (AC <10%ile) with abnormal dopplers; received Pen G for GBS prophylaxis. For induction she received Morphine/Compazine x1, tipton balloon, pitocin, epidural, AROM. She delivered by induced vaginal a Live Born infant on 3/13/23. Mirena IUD was placed immediately postpartum. Lot: [id number], Expiration: 2025      Information for the patient's :  Kaley Shaffer [[id number]]   female   Birth Weight: 5 lb 5.5 oz (2.425 kg)     Apgars: 8 at 1 minute and 9 at 5 minutes.      Postpartum course: normal.      Course of patient: uncomplicated    Discharge to: Home    Readmission planned: no     Recommendations on Discharge:     Medications: Medication List        START taking these medications      ibuprofen 600 MG tablet  Commonly known as: ADVIL;MOTRIN  Take 1 tablet by mouth every 6 hours as needed for Pain     sennosides-docusate sodium 8.6-50 MG tablet  Commonly known as: SENOKOT-S  Take 1 tablet by mouth daily            CONTINUE taking these medications      ondansetron 4 MG disintegrating tablet  Commonly known as: ZOFRAN-ODT  Take 1 tablet by mouth every 8 hours as needed for Nausea or Vomiting     Prenatal Vitamin 27-0.8 MG Tabs  Take 1 tablet by mouth daily            STOP taking these medications      aspirin EC 81 MG EC tablet     cephALEXin 500 MG capsule  Commonly known as: Merline Curoscar               Where to Get Your Medications        These medications were sent to [facility] [address] [facility], [address] [facility]  [address] [facility], [address]  [address]      Phone: [phone]   ibuprofen 600 MG tablet  sennosides-docusate sodium 8.6-50 MG tablet           Activity: pelvic rest x 6 weeks  Diet: regular diet  Follow up: 2 weeks for  postpartum appt    Condition on discharge: stable    Discharge date: 3/15/2023    Carin Lutz DO  Ob/Gyn Resident    Comments:  Home care and follow-up care were reviewed. Pelvic rest, and birth control were reviewed. Signs and symptoms of mastitis and post partum depression were reviewed. The patient is to notify her physician if any of these occur. The patient was counseled on secondary smoke risks and the increased risk of sudden infant death syndrome and respiratory problems to her baby with exposure. She was counseled on various alternate recommendations to decrease the exposure to secondary smoke to her children.

## 2023-03-13 NOTE — FLOWSHEET NOTE
[address] [facility] , [facility] at bedside. Epidural procedure explained, risks discussed. Pt verbalizes consent for epidural.   1839 patient positioned for epidural.1837  Time out completed. 1847 catheter placed. 1848 test dose given. Epidural catheter taped and secured per anesthesia. 1852  to low fowlers with left uterine displacement. 1852  loading dose given. 1857  pump initiated. Pt tolerated procedure well.

## 2023-03-13 NOTE — ANESTHESIA PRE PROCEDURE
Department of Anesthesiology  Preprocedure Note       Name:  Juliano Gordillo   Age:  21 y.o.  :  2005                                          MRN:  [id number]         Date:  3/13/2023      Surgeon: Valeria Griffin    Procedure: Labor Epidural    Medications prior to admission:   Prior to Admission medications    Medication Sig Start Date End Date Taking? Authorizing Provider   cephALEXin (KEFLEX) 500 MG capsule Take 1 capsule by mouth daily 22   Kylee Murrell DO   aspirin EC 81 MG EC tablet Take 1 tablet by mouth daily  Patient not taking: No sig reported 22   Mahsa Barcenas DO   Prenatal Vit-Fe Fumarate-FA (PRENATAL VITAMIN) 27-0.8 MG TABS Take 1 tablet by mouth daily 10/18/22   Yamile Lamb, DO   ondansetron (ZOFRAN-ODT) 4 MG disintegrating tablet Take 1 tablet by mouth every 8 hours as needed for Nausea or Vomiting 10/18/22   Marisol Gaytan DO       Current medications:    No current facility-administered medications for this visit. No current outpatient medications on file.      Facility-Administered Medications Ordered in Other Visits   Medication Dose Route Frequency Provider Last Rate Last Admin    lactated ringers IV soln infusion   IntraVENous Continuous Malena Margob,  mL/hr at 23 1226 New Bag at 23 1226    lactated ringers bolus  500 mL IntraVENous PRN Malena Margob, DO        Or    lactated ringers bolus  1,000 mL IntraVENous PRN Malena Margob, DO        sodium chloride flush 0.9 % injection 5-40 mL  5-40 mL IntraVENous 2 times per day Malena Margob, DO        sodium chloride flush 0.9 % injection 5-40 mL  5-40 mL IntraVENous PRN Malena Margob, DO        0.9 % sodium chloride infusion  25 mL IntraVENous PRN Malena Margob, DO        ondansetron (ZOFRAN) injection 4 mg  4 mg IntraVENous Q6H PRN Malena Margob, DO        diphenhydrAMINE (BENADRYL) tablet 25 mg  25 mg Oral Q4H PRN Malena Margob, DO        acetaminophen (TYLENOL) tablet 1,000 mg 1,000 mg Oral Q6H PRN Malena Margob, DO        benzocaine-menthol (DERMOPLAST) 20-0.5 % spray   Topical PRN Malena Margob, DO        penicillin G potassium 2.5 million units in 0.9% sodium chloride 100 mL IVPB  2.5 Million Units IntraVENous Q4H Malena Margob, DO   2.5 Million Units at 03/13/23 1702    cephALEXin (KEFLEX) capsule 500 mg  500 mg Oral Daily Malena Margob, DO   500 mg at 03/13/23 1242    oxytocin (PITOCIN) 30 units in 500 mL infusion  1-20 dayo-units/min IntraVENous Continuous Corine Vargas MD 8 mL/hr at 03/13/23 1808 8 dayo-units/min at 03/13/23 1808    levonorgestrel (MIRENA) IUD 52 mg 1 each  1 each IntraUTERine Once Corine Vargas MD        ropivacaine (NAROPIN) 0.2% injection 0.2%             naloxone 0.4 mg in 10 mL sodium chloride syringe   IntraVENous PRN Heron Narvaez MD        ropivacaine 0.2% in sodium chloride 0.9% (OB) epidural 100 mL  10 mL/hr Epidural Continuous Heron Narvaez MD           Allergies:     Allergies   Allergen Reactions    Seasonal        Problem List:    Patient Active Problem List   Diagnosis Code    THC use  F12.10    Family H/O CHD Z80.66    Late prenatal care O65.33    Hx gHTN (G1) Z87.59    Hx FGR (G1, G2) Z87.59    Dilatation of pulmonic artery  I28.8    Acute pyelonephritis in second trimester N10    Klebsiella pneumoniae UTI A49.8    Short interval between pregnancies affecting pregnancy, antepartum O09.899    Recent former cigar smoker Z87.891    Hx Gonorrhea/Chlamydia Z86.19    Pregravid BMI 25.23 Z68.25    High risk pregnancy, antepartum O09.90    GBS bacteriuria R82.71    Elevated 1 hr, 3 hr ** R73.09    31 weeks gestation of pregnancy Z3A.31    37 weeks gestation of pregnancy Z3A.37       Past Medical History:        Diagnosis Date    Allergic     Chlamydia 07/23/2018    Chronic allergic rhinitis 11/16/2017    Failed hearing screening 11/16/2017    Gonorrhea     Hypertension     Gestational hypertension G1    Mild tetrahydrocannabinol (THC) abuse 2018    Pyelonephritis 10/17/2022     17 M Apg 8/9 5#7 2018    THC use      THC use         Past Surgical History:  No past surgical history on file. Social History:    Social History     Tobacco Use    Smoking status: Former     Types: Cigars     Quit date: 8/15/2022     Years since quittin.5     Passive exposure: Never    Smokeless tobacco: Never    Tobacco comments:     Patient counseled on maternal/fetal risk factors. Pt verbalizes understanding     Substance Use Topics    Alcohol use: Not Currently     Comment: Patient reports last drink 2022                                Counseling given: Not Answered  Tobacco comments: Patient counseled on maternal/fetal risk factors. Pt verbalizes understanding        Vital Signs (Current): There were no vitals filed for this visit. BP Readings from Last 3 Encounters:   23 (!) 92/33   23 112/68   23 110/62       NPO Status:                                                                                 BMI:   Wt Readings from Last 3 Encounters:   23 148 lb (67.1 kg) (78 %, Z= 0.78)*   23 144 lb (65.3 kg) (74 %, Z= 0.65)*   23 142 lb (64.4 kg) (72 %, Z= 0.58)*     * Growth percentiles are based on CDC (Girls, 2-20 Years) data.      There is no height or weight on file to calculate BMI.    CBC:   Lab Results   Component Value Date/Time    WBC 6.9 2023 12:28 PM    RBC 3.83 2023 12:28 PM    HGB 11.7 2023 12:28 PM    HCT 34.7 2023 12:28 PM    MCV 90.6 2023 12:28 PM    RDW 12.6 2023 12:28 PM     2023 12:28 PM       CMP:   Lab Results   Component Value Date/Time     2023 06:39 PM    K 3.6 2023 06:39 PM     2023 06:39 PM    CO2 18 2023 06:39 PM    BUN 7 2023 06:39 PM    CREATININE 0.48 2023 06:39 PM    GFRAA NOT REPORTED 2022 08:23 AM    LABGLOM >60 2023 06:39 PM    GLUCOSE 72 2023 06:39 PM    PROT 6.8 10/17/2022 01:57 PM    CALCIUM 8.9 2023 06:39 PM    BILITOT 0.3 10/17/2022 01:57 PM    ALKPHOS 71 10/17/2022 01:57 PM    AST 17 10/17/2022 01:57 PM    ALT 11 10/17/2022 01:57 PM       POC Tests:   No results for input(s): POCGLU, POCNA, POCK, POCCL, POCBUN, POCHEMO, POCHCT in the last 72 hours. Coags: No results found for: PROTIME, INR, APTT    HCG (If Applicable):   Lab Results   Component Value Date    PREGTESTUR POSITIVE 2022    HCGQUANT 39,763 (H) 2021        ABGs: No results found for: PHART, PO2ART, OIK7HRE, BSO0QBB, BEART, Q1QNDWDQ     Type & Screen (If Applicable):  No results found for: LABABO, LABRH    Drug/Infectious Status (If Applicable):  Lab Results   Component Value Date/Time    HEPCAB NONREACTIVE 10/18/2022 04:11 PM       COVID-19 Screening (If Applicable):   Lab Results   Component Value Date/Time    COVID19 DETECTED 2022 11:29 PM           Anesthesia Evaluation   no history of anesthetic complications:   Airway: Mallampati: II  TM distance: >3 FB   Neck ROM: full  Mouth opening: > = 3 FB   Dental:          Pulmonary:normal exam    (+) current smoker    (-) asthma                           Cardiovascular:Negative CV ROS        (-) hypertension (hx ghtn), past MI and  angina      Rhythm: regular  Rate: normal                    Neuro/Psych:   (+) psychiatric history (THC abuse):   (-) seizures and CVA           GI/Hepatic/Renal:        (-) GERD, liver disease and no renal disease       Endo/Other: Negative Endo/Other ROS       (-) diabetes mellitus, hypothyroidism, hyperthyroidism, blood dyscrasia               Abdominal:             Vascular: Other Findings:  uterus            Anesthesia Plan      epidural     ASA 3     (Platelets 023)        Anesthetic plan and risks discussed with patient.     Use of blood products discussed with patient whom consented to blood products. Plan discussed with attending.                     Bryant Lorenzo, APRN - CRNA   3/13/2023

## 2023-03-13 NOTE — PROGRESS NOTES
Instructed pt on importance to keep all prim OB visits and need to call and reschedule missed appt. Pt verb.  Understanding

## 2023-03-13 NOTE — H&P
OBSTETRICAL HISTORY [facility]    Date: 3/13/2023       Time: 6:45 PM   Patient Name: Mabel Elliott     Patient : 2005  Room/Bed: [facility]0708-    Admission Date/Time: 3/13/2023 11:34 AM    CC: Induction of Labor secondary to IUGR w/ Abnormal Dopplers     HPI: Mabel Elliott is a 21 y.o.  at 37w2d who presents to L&D for MFM recommended delivery induction to FGR and abnormal dopplers. The patient reports fetal movement is present, denies contractions, denies loss of fluid, denies vaginal bleeding. Patient denies headache, vision changes, nausea, vomiting, fever, chills, shortness of breath, chest pain, RUQ pain, abdominal pain, diarrhea, change in color/amount/odor of vaginal discharge, dysuria or, hematuria. DATING:  LMP: Patient's last menstrual period was 2022 (exact date).   Estimated Date of Delivery: 23   Based on: LMP, confirmed by ultrasound at 15 6/7 weeks GA    PREGNANCY RISK FACTORS:  Patient Active Problem List   Diagnosis    THC use     Family H/O CHD    Late prenatal care    Hx gHTN (G1)    Hx FGR (G1, G2)    Dilatation of pulmonic artery     Acute pyelonephritis in second trimester    Klebsiella pneumoniae UTI    Short interval between pregnancies affecting pregnancy, antepartum    Recent former cigar smoker    Hx Gonorrhea/Chlamydia    Pregravid BMI 25.23    High risk pregnancy, antepartum    GBS bacteriuria    Elevated 1 hr, 3 hr **    31 weeks gestation of pregnancy    37 weeks gestation of pregnancy      Steroids Given In This Pregnancy:  no     REVIEW OF SYSTEMS:   Constitutional: negative fever, negative chills, negative weight changes   HEENT: negative visual disturbances, negative headaches, negative dizziness, negative hearing loss  Breast: Negative breast abnormalities, negative breast lumps, negative nipple discharge  Respiratory: negative dyspnea, negative cough, negative SOB  Cardiovascular: negative chest pain,  negative palpitations, negative arrhythmia, negative syncope   Gastrointestinal: negative abdominal pain, negative RUQ pain, negative N/V, negative diarrhea, negative constipation, negative bowel changes, negative heartburn   Genitourinary: negative dysuria, negative hematuria, negative urinary incontinence, negative vaginal discharge, negative vaginal bleeding or spotting  Dermatological: negative rash, negative pruritis, negative mole or other skin changes  Hematologic: negative bruising  Immunologic/Lymphatic: negative recent illness, negative recent sick contact  Musculoskeletal: negative back pain, negative myalgias, negative arthralgias  Neurological:  negative dizziness, negative migraines, negative seizures, negative weakness  Behavior/Psych: negative depression, negative anxiety, negative SI, negative HI    OBSTETRIC HISTORY:   OB History    Para Term  AB Living   3 2 2 0 0 2   SAB IAB Ectopic Molar Multiple Live Births   0 0 0 0 0 2      # Outcome Date GA Lbr Delroy/2nd Weight Sex Delivery Anes PTL Lv   3 Current            2 Term 20 38w5d 13:07 / 00:13 5 lb 10.3 oz (2.56 kg) F Vag-Spont EPI N JULISSA      Name: Christie Setter: 8  Apgar5: 9   1 Term 16 39w0d 09:22 / 02:23 5 lb 7.5 oz (2.48 kg) M  EPI N JULISSA      Name: Richmond Tipton: 8  Apgar5: 9      Obstetric Comments   G1- FOB #1    at age 14 due to murder 2019   G2- FOB #2   G3- FOB #2       PAST MEDICAL HISTORY:   has a past medical history of Allergic, Chlamydia, Chronic allergic rhinitis, Failed hearing screening, Gonorrhea, Hypertension, Mild tetrahydrocannabinol (THC) abuse, Pyelonephritis,  17 M Apg 8/9 5#7, THC use , and THC use . PAST SURGICAL HISTORY:   has no past surgical history on file. ALLERGIES:  is allergic to seasonal.    MEDICATIONS:  Prior to Admission medications    Medication Sig Start Date End Date Taking?  Authorizing Provider cephALEXin (KEFLEX) 500 MG capsule Take 1 capsule by mouth daily 12/6/22   Mireya Gambino, DO   aspirin EC 81 MG EC tablet Take 1 tablet by mouth daily  Patient not taking: No sig reported 11/16/22   Sylvie Sharp DO   Prenatal Vit-Fe Fumarate-FA (PRENATAL VITAMIN) 27-0.8 MG TABS Take 1 tablet by mouth daily 10/18/22   Maikol Garzapool,    ondansetron (ZOFRAN-ODT) 4 MG disintegrating tablet Take 1 tablet by mouth every 8 hours as needed for Nausea or Vomiting 10/18/22   Trae Dailey DO       FAMILY HISTORY:  family history includes Allergy (Severe) in her brother and sister; Asthma in her mother; Cancer in her maternal grandfather and maternal grandmother; Depression in her mother; Heart Disease in her maternal grandfather; High Blood Pressure in her maternal grandfather, maternal grandmother, and mother; No Known Problems in her father, paternal grandfather, and paternal grandmother; Other in her brother and mother; Stroke in her maternal grandfather and maternal grandmother. SOCIAL HISTORY:   reports that she quit smoking about 6 months ago. Her smoking use included cigars. She has never been exposed to tobacco smoke. She has never used smokeless tobacco. She reports that she does not currently use alcohol. She reports that she does not currently use drugs after having used the following drugs: Marijuana Cristina Coad).     VITALS:  Vitals:    03/13/23 1802   BP: (!) 92/33   Pulse: 90   Resp: 20   Temp:    SpO2:          PHYSICAL EXAM:  Fetal Heart Monitor:  Baseline Heart Rate 120, moderate variability, absent accelerations, no contractions present and consequently no decelerations are present   [employer]: contractions, none    General appearance:  no apparent distress, alert and cooperative  HEENT: head atraumatic, normocephalic, moist mucous membranes, trachea midline  Neurologic:  alert, oriented, normal speech, no focal findings or movement disorder noted  Lungs:  No increased work of breathing, good air exchange, clear to auscultation bilaterally, no crackles or wheezing  Heart:  regular rate and rhythm and no murmur, rubs, gallops  Abdomen:  soft, gravid, mild supra-pubic tenderness, no rebound, guarding, or rigidity, no RUQ or epigastric tenderness, no signs or symptoms of abruption, no signs or symptoms of chorioamnionitis, abdominal scars: none,   Extremities:  no calf tenderness, non edematous, no varicosities, full range of motion in all four extremities, DTR's: +2/4 bilateral lower extremities   Musculoskeletal: Gross strength equal and intact throughout, no gross abnormalities, range of motion normal in hips, knees, shoulders and spine, CVA tenderness: none  Psychiatric: Mood appropriate, normal affect   Rectal Exam: not indicated  Pelvic Exam:   Sterile Vaginal Exam:  Cervix: No cervical motion tenderness   Uterus: Is gravid, Normal size, shape, consistency and non-tender  Cervix: 2 cm dilated, 50 % effaced, -2 station, mid position (out of 3 station), medium consistency, FETAL POSITION: Cephalic (confirmed by ultrasound), Membranes intact,    Bishops Score: 5     0 1 2 3   Position Posterior Intermediate Anterior -   Consistency Firm Intermediate Soft -   Effacement 0-30% 31-50% 51-80% >80%   Dilation 0cm 1-2cm 3-4cm >5cm   Fetal Station -3 -2 -1, 0 +1, +2     LIMITED BEDSIDE US:  Position: Cephalic  Placental Location: posterior  Fetal Heart Tones: Present  Fetal Movement: Present   Amniotic Fluid Index/Volume:  adequate 2x2 cm fluid pocket  Estimated Fetal Weight:  5 lbs 4oz per MFM scan on 3/13/23    PRENATAL LAB RESULTS:   Blood Type/Rh: O pos  Antibody Screen: negative  Hemoglobin, Hematocrit, Platelets: 82.7/92.6/274  Rubella: immune  T.  Pallidum, IgG: non-reactive  Hepatitis B Surface Antigen: non-reactive   Hepatitis C Antibody: non-reactive   HIV: non-reactive   Gonorrhea: negative  Chlamydia: negative  Urine culture: positive - Klebsiella Pneumoniae 10-50,000 CFU, date: 10/17/22, negative 22, positive 1/3/23 beta hemolytic GBS < 1000 CFU     1 hour Glucose Tolerance Test:  146  3 hour Glucose Tolerance Test: ordered, not done by patient    Group B Strep: positive urine culture on 1/3/23  Cystic Fibrosis Screen: negative  Sickle Cell Screen: negative  First Trimester Screen: not available  QUAD: not available  MSAFP: negative  Non-Invasive Prenatal Testing: not available  Anatomy US: posterior placenta, 3VC, female gender, normal anatomy    ASSESSMENT & PLAN:  Fadia Lebron is a 21 y.o. female  at 37w2d IUP   - GBS bacteuria / Rh positive / R immune   - Pen G for GBS prophylaxis     MFM rec IOL 2/2 FGR (AC < 10%tile) w/ increased MCA and UADs   - Admit to labor and delivery under the service of Dr. Estefanía Maza   - VSS    - cEFM and TOCO   - Cat 1 FHT and TOCO showing no contractions   - CBC, T&S, T.Pal ordered   - UDS ordered. R/B/A discussed with patient and patient declined   - IVF: LR @ 125 cc/hr   - Plan of Induction: Pitocin and tipton balloon      Dilation of Pulmonic Artery  - Noted on CT chest on 2022. Echo at that time wnl  - Patient clinically asymptomatic at this time.  Has not followed with cardiology since then      Maternal Pyelonephritis   - Continue maternal suppression therapy with Keflex 500 QD      Elevated 1hr GTT, No 3hr GTT       Limited Prenatal Care  - SW consult PP       Teen Pregnancy   - SW consult PP      SIP   - Last delivery was 2022      [place] (rslvd)       Hx FGR x2  - Patient followed with [facility]       Hx gHTN   - BP normotensive on admission  - Denies s/s PreE  - Continue to monitor closely       THC Use    - Positive UDS in pregnancy   - Patient declined UDS on admission   - SW consult PP    BMI 30  Patient Active Problem List    Diagnosis Date Noted    37 weeks gestation of pregnancy 2023     Priority: Medium    31 weeks gestation of pregnancy 2023     Priority: Medium    Elevated 1 hr, 3 hr ** 2023 Priority: Medium    Acute pyelonephritis in second trimester 10/17/2022     Priority: Medium     Culture: klebsiella pneumoniae. Prescribed Keflex 500mg four times daily x7 days, 500mg Keflex once a day for suppression  12/6/22:patient reports not taking suppression therapy. Rx re-sent to pharmacy. Repeat UCx ordered      Klebsiella pneumoniae UTI 10/17/2022     Priority: Medium     10/17/22: Pyelo. Prescribed 500mg Keflex four times daily, x7 days.       GBS bacteriuria 01/04/2023     GBS + via Urine Cx 1/3/23      Short interval between pregnancies affecting pregnancy, antepartum 11/16/2022     G2: 2/23/22  G3: SELVIN 4/1/23      Recent former cigar smoker 11/16/2022 11/16/22: Patient reports she quit smoking Black and Mild cigars when she learned of pregnancy, August 2022      Hx Gonorrhea/Chlamydia 11/16/2022     Chlamydia  07/09/18: positive, treated with IV antibiotic    10/29/19: positive  09/07/20: negative    03/16/21: positive  04/07/21: negative  10/17/22: negative    Gonorrhea  09/07/20: positive  02/15/21: negative  10/17/22: negative      Pregravid BMI 25.23 11/16/2022 11/16/22: Recommended weight gain of 15 to 25 pounds; patient verbalized understanding      High risk pregnancy, antepartum 11/16/2022     Fetal testing indicated: No  Fetal testing indication: N/A  Fetal testing initiation: N/A  Fetal testing frequency: N/A      Dilatation of pulmonic artery       1/12/22: Patient was admitted to the hospital for positive COVID with chest pain and SOB              - Work up at that time showed dilation of pulmonary artery on CT, however WINSTON could not be done due to positive COVID               - Echo performed showing EF 53%, no significant valvular regurgitation or stenosis seen, pulmonic valve not well visualized but doppler velocities normal              - Cardiology recommended outpatient follow up with WINSTON after COVID resolved              - Patient reports being unaware of diagnosis and has not followed up with cardiology              - VSS              - Denies any SOB or chest pain at this time    Patient was to have followed up with cardiology after delivery; no followup noted in chart      Hx gHTN (G1) 2021    Hx FGR (G1, G2) 2021     G1: IoL IUGR  G2: IoL IUGR 38w4d      Late prenatal care 10/11/2021     G3: 20w4d at OB intake      Family H/O CHD 2018     Two half  sisters  Same mother but different fathers      THC use  2018     10/17/22: UDS positive. 22: Patient reports smoking marijuana \"every other day. \" Patient was counseled not recommended in pregnancy; maternal/fetal risks reviewed. Patient verbalizes understanding. Plan discussed with Dr. Wisam Calvert, who is agreeable. Steroids given this admission: No    Risks, benefits, alternatives and possible complications have been discussed in detail with the patient. Admission, and post admission procedures and expectations were discussed in detail. All questions were answered. Attending's Name: Dr. Tory West MD  Ob/Gyn Resident  3/13/2023, 6:45 PM    Date: 3/14/2023  Time: 7:14 AM      Patient Name: Rodney Liang  Patient : 2005  Room/Bed: 5441/5049-16  Admission Date/Time: 3/13/2023 11:34 AM        Attending Physician Statement  I have discussed the care of Rodney Liang, including pertinent history and exam findings with the resident. I have reviewed and edited their note in the electronic medical record. The key elements of all parts of the encounter have been performed/reviewed by me . I agree with the assessment, plan and orders as documented by the resident. The level of care submitted represents to the best of my ability the care documented in the medical record today. GC Modifier. This service has been performed in part by a resident under the direction of a teaching physician.         Attending's Name:  Renea Sandifer, DO

## 2023-03-14 PROCEDURE — 2580000003 HC RX 258: Performed by: STUDENT IN AN ORGANIZED HEALTH CARE EDUCATION/TRAINING PROGRAM

## 2023-03-14 PROCEDURE — 1220000000 HC SEMI PRIVATE OB R&B

## 2023-03-14 PROCEDURE — 6370000000 HC RX 637 (ALT 250 FOR IP): Performed by: STUDENT IN AN ORGANIZED HEALTH CARE EDUCATION/TRAINING PROGRAM

## 2023-03-14 RX ADMIN — IBUPROFEN 600 MG: 600 TABLET, FILM COATED ORAL at 08:24

## 2023-03-14 RX ADMIN — SODIUM CHLORIDE, PRESERVATIVE FREE 10 ML: 5 INJECTION INTRAVENOUS at 08:25

## 2023-03-14 RX ADMIN — ACETAMINOPHEN 1000 MG: 500 TABLET ORAL at 17:48

## 2023-03-14 RX ADMIN — DOCUSATE SODIUM 100 MG: 100 CAPSULE ORAL at 08:25

## 2023-03-14 RX ADMIN — IBUPROFEN 600 MG: 600 TABLET, FILM COATED ORAL at 17:48

## 2023-03-14 RX ADMIN — ACETAMINOPHEN 1000 MG: 500 TABLET ORAL at 01:10

## 2023-03-14 ASSESSMENT — PAIN DESCRIPTION - DESCRIPTORS
DESCRIPTORS: SORE;CRAMPING
DESCRIPTORS: CRAMPING

## 2023-03-14 ASSESSMENT — PAIN SCALES - GENERAL
PAINLEVEL_OUTOF10: 5
PAINLEVEL_OUTOF10: 6
PAINLEVEL_OUTOF10: 8

## 2023-03-14 ASSESSMENT — PAIN DESCRIPTION - LOCATION
LOCATION: ABDOMEN
LOCATION: ABDOMEN
LOCATION: BACK;ABDOMEN

## 2023-03-14 NOTE — CARE COORDINATION
CASE MANAGEMENT POST-PARTUM TRANSITIONAL CARE PLAN    37 weeks gestation of pregnancy [Z3A.37]    OB Provider: [facility]    Writer met w/ Madeline Tidwell at bedside to discuss DCP. She is S/P  on 3/13/2023    Writer verified address/phone number correct on facesheet. She states she lives with her boyfriend Daphne Bernal, her daughter and son. Madeline Tidwell verbalized no difficulties with transportation to and from doctors appointments or with paying for medications upon discharge home. [employer] correct. Writer notified Madeline Tidwell she has 30 days from date of birth to add  to insurance policy. She  verbalized understanding. Madeline Tidwell confirmed a safe place for infant to sleep at home. Infant name on BC: [address] Infant PCP [facility].      DME: no  HOME CARE: no    Anticipate DC of couplet 3/15/2023    Readmission Risk              Risk of Unplanned Readmission:  8

## 2023-03-14 NOTE — CARE COORDINATION
Social Work     Sw reviewed medical record (current active problem list) and tox screens and found that mom was +THC on 10/17/22, received late prenatal care, and denied a CATRINA at delivery. Sw spoke with mom and Fob (Lyla Vargas) briefly to explain Sw role, inquire if any needs or concerns, and provide safe sleep education and discuss. Mom provided verbal consent for Fob to be present during assessment. Mom denied any needs or questions and informs baby has a safe sleep environment (Bassinet). Mom denied any current s/s of anxiety or depression and is aware to reach out to OB if any s/s occur after dc. Mom reports that she would feel comfortable reaching out to her OB as needed and denies any barriers. Mom reports a good support system that includes Fob, her parents and Fob's mom and denied any current questions or needs. Mom reports this is her 3rd child (4, 1). Mom reports that she resides with Fob and 2 kids. Mom states ped will be at [facility]. Mom denied any barriers to pediatric appointments. Mom reports that she plans to get connected with WIC. Sw did provide mom with [facility] [employer] ([facility]) flyer so she is aware of this new free resource in state of [place]. Sw discussed mom's denial of CATRINA. Mom reports that she denied CATRINA because she could not \"use the restroom after delivery\". Sw discussed OTF mandate with mom who was understanding. [facility] referral made Jonathon Bob)     Sw encouraged mom to reach out if any issues or concerns arise. No other concerns, baby is clear to dc with mom.          Elizabeth Intern Otis Colunga

## 2023-03-14 NOTE — PROGRESS NOTES
POST PARTUM DAY # 1    Rina Cheney is a 21 y.o. female  This patient was seen & examined today.  with IUD on 3/13/23    Her pregnancy was complicated by:   Patient Active Problem List   Diagnosis    THC use     Family H/O CHD    Late prenatal care    Hx gHTN (G1)    Hx FGR (G1, G2)    Dilatation of pulmonic artery     Acute pyelonephritis in second trimester    Klebsiella pneumoniae UTI    Short interval between pregnancies affecting pregnancy, antepartum    Recent former cigar smoker    Hx Gonorrhea/Chlamydia    Pregravid BMI 25.23    High risk pregnancy, antepartum    GBS bacteriuria    Elevated 1 hr, 3 hr **    31 weeks gestation of pregnancy    37 weeks gestation of pregnancy     w/ IUD 3/13/23 F Apg 8/ Wt 5#5    Mirena IUD in place       Today she is doing well without any chief complaint. Her lochia is light. She denies chest pain, shortness of breath, headache, lightheadedness and blurred vision. She is not breast feeding and she denies any breast tenderness. She is ambulating well. Her voiding pattern is normal. Passing flatus, no BM yet. I reviewed signs and symptoms of post partum depression with the patient, she currently denies any of these symptoms. She is tolerating solids.      Vital Signs:  Vitals:    23 0000 23 0015 23 0045 23 0400   BP: 122/68 118/69 110/70 119/68   Pulse: 81 98 78 59   Resp: 16 16 18 18   Temp:   97.8 °F (36.6 °C) 97.9 °F (36.6 °C)   TempSrc:   Oral Oral   SpO2:   100%         Physical Exam:  General:  no apparent distress, alert and cooperative  Neurologic:  alert, oriented, normal speech, no focal findings or movement disorder noted  Lungs:  No increased work of breathing, good air exchange, clear to auscultation bilaterally, no crackles or wheezing  Heart:  Normal apical impulse, regular rate and rhythm, normal S1 and S2, no S3 or S4, and no murmur noted    Abdomen: abdomen soft, non-distended, non-tender  Fundus: non-tender, firm, below umbilicus  Extremities:  no calf tenderness, non edematous    Lab:  Lab Results   Component Value Date    HGB 11.7 (L) 2023     Lab Results   Component Value Date    HCT 34.7 (L) 2023       Assessment/Plan:  Daniel Ariza is a [id number] PPD # 1 s/p  with IUD   - Doing well, VSS   - Female infant in [place] [id number] Nursery   - Encourage ambulation   - Postpartum Hgb/Hct if symptomatic   - Motrin/Tylenol    Rh positive/Rubella immune              - Rhogam not indicated     Bottle feeding              - Denies s/s of mastitis     Elevated BPs    - 2 elevated non severe BP prior to delivery    - Has not met criteria for hypertensive diagnosis              - Bps normotensive              - Denies s/s of PreE   - Continue to monitor    Limited prenatal care   - Social work consult pp    THC use   - UDS on admission refused   - UDS 10/17/22 positive during pregnancy    - Social work consult post partum    Hx of Maternal Pyelonephritis              - Was on suppression therapy               - Keflex discontinued     Continue post partum care    Counseling Completed:  Secondary Smoke risks and Sudden Infant Death Syndrome were reviewed with recommendations. Infant sleeping, \"back to sleep\" and avoidance of co-sleeping recommendations were reviewed. Signs and Symptoms of Post Partum Depression were reviewed. The patient is to call if any occur. Signs and symptoms of Mastitis were reviewed. The patient is to call if any occur for follow up.   Discharge instructions including pelvic rest, no driving with pain medicine and office follow-up were reviewed with patient     Attending Physician: Dr. Judge Lucina MD  Ob/Gyn Resident   3/14/2023, 5:20 AM

## 2023-03-14 NOTE — PLAN OF CARE
Problem: Vaginal Birth or  Section  Goal: Fetal and maternal status remain reassuring during the birth process  Description:  Birth OB-Pregnancy care plan goal which identifies if the fetal and maternal status remain reassuring during the birth process  Outcome: Progressing     Problem: Skin/Tissue Integrity  Goal: Absence of new skin breakdown  Description: 1. Monitor for areas of redness and/or skin breakdown  2. Assess vascular access sites hourly  3. Every 4-6 hours minimum:  Change oxygen saturation probe site  4. Every 4-6 hours:  If on nasal continuous positive airway pressure, respiratory therapy assess nares and determine need for appliance change or resting period.   Outcome: Progressing     Problem: Postpartum  Goal: Experiences normal postpartum course  Description:  Postpartum OB-Pregnancy care plan goal which identifies if the mother is experiencing a normal postpartum course  Outcome: Progressing  Goal: Appropriate maternal -  bonding  Description:  Postpartum OB-Pregnancy care plan goal which identifies if the mother and  are bonding appropriately  Outcome: Progressing  Goal: Establishment of infant feeding pattern  Description:  Postpartum OB-Pregnancy care plan goal which identifies if the mother is establishing a feeding pattern with their   Outcome: Progressing  Goal: Incisions, wounds, or drain sites healing without S/S of infection  Outcome: Progressing     Problem: Pain  Goal: Verbalizes/displays adequate comfort level or baseline comfort level  Outcome: Progressing     Problem: Safety - Adult  Goal: Free from fall injury  Outcome: Progressing     Problem: Discharge Planning  Goal: Discharge to home or other facility with appropriate resources  Outcome: Progressing

## 2023-03-14 NOTE — L&D DELIVERY NOTE
Vaginal Delivery Note  Department of Obstetrics and Gynecology  [address] [facility]       Patient: Stevie Christensen   : 2005  MRN: [id number]   Date of delivery: 3/13/23     Pre-operative Diagnosis: Gary Wilburn at 42w2d  [facility] recommended delivery 2/2 fetal growth restriction (AC<10%) and elevated UADs  Elevated BPs  Hx pyelonephritis  Limited prenatal care  Short interval pregnancy  THC use   BMI 30    Post-operative Diagnosis:  same as above and  living infant female    Delivering Obstetrician & Assistant(s): Dr. Gayle Sharma, DO, PGY4; Brandin Brown, , PGY2    Infant Information:   Information for the patient's :  Adrián Chol [[id number]]      Information for the patient's :  Adrián Chol [[id number]]        Apgar scores: 8 at 1 minute and 9 at 5 minutes. Anesthesia:  epidural anesthesia    Application and Delivery:    She was known to be GBS bacteriuria and received Pen G for antibiotic prophylaxis. The patient progressed well, received an epidural, became complete and felt the urge to push. After pushing with contractions the fetal head delivered Cephalic, right occiput anterior over an intact perineum, nuchal cord was not present. The anterior, then posterior shoulder delivered easily and atraumatically followed by the rest of the infant. Nose and mouth suctioned with bulb suction, infant was stimulated and dried. Cord was clamped and cut after one minute delayed cord clamping and infant was placed on maternal abdomen, and attended by RN for evaluation. The delivery of the placenta was spontaneous and appeared intact, whole, and that the umbilical cord had three vessels noted. Pitocin was started. The vagina was swept of all clots and debris. The perineum and vagina were evaluated and a bilateral periurethral lacerations were found and hemostatic.      Attention was then turned to the IUD insertion. The Mirena IUD was opened and loaded into the delivery system. The wand was inserted just past the internal portion and the button was retracted to the first line. The wand was held in place for 10 seconds and then the button was retracted to its final position while the IUD was moved to the fundus. The string was trimmed in standard fashion. Mirena IUD Lot #: [id number]; Expiration date: 2025. Mother and baby tolerated procedure well. Dr. Kym Leija was present for entire delivery.     Delivery Summary:       Specimen: placenta (discarded), cord blood, and cord gases  Quantitative blood loss:  100ml immediately following delivery  Condition:  infant stable to general nursery and mother stable  Counts: instrument and sponge counts correct  Blood Type and Rh: O POSITIVE    Rubella Immunity Status: immune  Infant Feeding: bottle feeding    Shu Castaneda DO  Ob/Gyn Resident  3/13/2023, 10:04 PM  Mother's Information      Labor Events      Cervical Ripening:   Now               Garretton Jasper Pending Link Keto [[id number]]      Labor Events      Cervical Ripening Date/Time:       Rupture Date/Time: 3/13/23 20:15:00   Rupture Type: AROM  Fluid Color: Clear, Bloody Show  Fluid Odor: None       Anesthesia           Start Pushing      Labor onset date/time:   Now     Dilation complete date/time:   Now     Start pushing date/time:    Decision date/time (emergent ):           Delivery ([place])      Delivery Date/Time:          Details:            Shoulder Dystocia    Add Second Maneuver  Add Third Maneuver  Add Fourth Maneuver  Add Fifth Maneuver  Add Sixth Maneuver  Add Seventh Maneuver  Add Eighth Maneuver  Add Ninth Maneuver       Assisted Delivery Details           Document Additional Attempt         Document Additional Attempt                 Cord           Placenta           Lacerations           Vaginal Counts        Sponges Needles Instruments   Initial Counts      Final Counts      If the count is incorrect due to Intentionally Retained Foreign Object (IRFO) add the IRFO LDA in Lines/Drains. Add LDA: Link to [facility]       Blood Loss  Mother: Dava Siemens #[id number]     Start of Mother's Information      Delivery Blood Loss  23 0956 - 23 2156      None                 End of Mother's Information  Mother: Dava Siemens #[id number]                Delivery Providers    Delivering clinician:              Duncan Assessment       Apgar Scoring Key:    0 1 2    Skin Color: Blue or pale Acrocyanotic Completely pink    Heart Rate: Absent <100 bpm >100 bpm    Reflex Irritability: No response Grimace Cry or active withdrawal    Muscle Tone: Limp Some flexion Active motion    Respiratory Effort: Absent Weak cry; hypoventilation Good, crying                      Skin Color:   Heart Rate:   Reflex Irritability:   Muscle Tone:   Respiratory Effort:    Total:            1 Minute:        Apgar 1 total from OB History    5 Minute:        Apgar 5 total from OB History    10 Minute:              15 Minute:              20 Minute:                                 Resuscitation                Measurements               Title      Skin to Skin Initiation Date/Time:       Skin to Skin End Date/Time:

## 2023-03-14 NOTE — FLOWSHEET NOTE
PT ADMITTED TO [id number] FROM L&D VIA W/C. PT ASSISTED INTO BED. ORIENTED TO ROOM AND SURROUNDINGS. PLAN OF CARE, INFANT SECURITY AND VISITING HOURS DISCUSSED. PT VERBALIZES UNDERSTANDING. CALL LIGHT IN REACH AND SIDE RAILS UP X2.

## 2023-03-14 NOTE — PROGRESS NOTES
Pt does practice pushes with Dr. Nicol Redmond to attempt to reduce cervix, unsuccessful, pt placed on  L side with peanut ball

## 2023-03-14 NOTE — ANESTHESIA POSTPROCEDURE EVALUATION
Department of Anesthesiology  Postprocedure Note    Patient: Corinne Cowper  MRN: [id number]  YOB: 2005  Date of evaluation: 3/14/2023      Procedure Summary     Date: 03/13/23 Room / Location:     Anesthesia Start: 1830 Anesthesia Stop: 2142    Procedure: Labor Analgesia Diagnosis:     Scheduled Providers:  Responsible Provider: Alana Mendoza MD    Anesthesia Type: epidural ASA Status: 3          Anesthesia Type: No value filed.     Nitin Phase I: Nitin Score: 9    Ntiin Phase II:        Anesthesia Post Evaluation    Patient location during evaluation: bedside  Patient participation: complete - patient participated  Level of consciousness: awake  Pain score: 2  Airway patency: patent  Nausea & Vomiting: no nausea and no vomiting  Complications: no  Cardiovascular status: blood pressure returned to baseline  Respiratory status: acceptable  Hydration status: stable

## 2023-03-15 VITALS
DIASTOLIC BLOOD PRESSURE: 60 MMHG | HEART RATE: 72 BPM | SYSTOLIC BLOOD PRESSURE: 108 MMHG | OXYGEN SATURATION: 99 % | RESPIRATION RATE: 16 BRPM | TEMPERATURE: 98 F

## 2023-03-15 PROCEDURE — 6370000000 HC RX 637 (ALT 250 FOR IP): Performed by: STUDENT IN AN ORGANIZED HEALTH CARE EDUCATION/TRAINING PROGRAM

## 2023-03-15 RX ADMIN — IBUPROFEN 600 MG: 600 TABLET, FILM COATED ORAL at 08:47

## 2023-03-15 RX ADMIN — ACETAMINOPHEN 1000 MG: 500 TABLET ORAL at 08:47

## 2023-03-15 RX ADMIN — DOCUSATE SODIUM 100 MG: 100 CAPSULE ORAL at 08:45

## 2023-03-15 ASSESSMENT — PAIN SCALES - GENERAL: PAINLEVEL_OUTOF10: 4

## 2023-03-15 ASSESSMENT — PAIN DESCRIPTION - DESCRIPTORS: DESCRIPTORS: CRAMPING;DISCOMFORT

## 2023-03-15 ASSESSMENT — PAIN DESCRIPTION - ORIENTATION: ORIENTATION: LOWER

## 2023-03-15 ASSESSMENT — PAIN DESCRIPTION - LOCATION: LOCATION: ABDOMEN

## 2023-03-15 ASSESSMENT — PAIN - FUNCTIONAL ASSESSMENT: PAIN_FUNCTIONAL_ASSESSMENT: ACTIVITIES ARE NOT PREVENTED

## 2023-03-15 NOTE — FLOWSHEET NOTE
Pt discharged to private residence via ambulatory (declined need for wheelchair) in stable condition with belongings  Discharge instructions given  \"Meds To Beds\" medication at bedside  Pt denies having any further questions at this time  personal items given to patient at discharge  Patient/family state they have everything they were admitted with.   BP cuff sent home with patient

## 2023-03-15 NOTE — FLOWSHEET NOTE
Mom resting in bed with eyes closed. Difficult to awaken. Updated regarding discussion with pediatrician. States she's hungry and does not like the food and was waiting for FOB to bring her food, relating this to be the reason she wants to go home. Writer provided boxed lunch and encouraged to reach out to father of baby again. Mom relates she is tired and would like infant to remain in nursery at this time, but that she just wants to sleep and will leave tomorrow.

## 2023-03-15 NOTE — PLAN OF CARE
Problem: Vaginal Birth or  Section  Goal: Fetal and maternal status remain reassuring during the birth process  Description:  Birth OB-Pregnancy care plan goal which identifies if the fetal and maternal status remain reassuring during the birth process  Outcome: Completed     Problem: Skin/Tissue Integrity  Goal: Absence of new skin breakdown  Description: 1. Monitor for areas of redness and/or skin breakdown  2. Assess vascular access sites hourly  3. Every 4-6 hours minimum:  Change oxygen saturation probe site  4. Every 4-6 hours:  If on nasal continuous positive airway pressure, respiratory therapy assess nares and determine need for appliance change or resting period.   Outcome: Completed     Problem: Postpartum  Goal: Experiences normal postpartum course  Description:  Postpartum OB-Pregnancy care plan goal which identifies if the mother is experiencing a normal postpartum course  Outcome: Completed  Goal: Appropriate maternal -  bonding  Description:  Postpartum OB-Pregnancy care plan goal which identifies if the mother and  are bonding appropriately  Outcome: Completed  Goal: Establishment of infant feeding pattern  Description:  Postpartum OB-Pregnancy care plan goal which identifies if the mother is establishing a feeding pattern with their   Outcome: Completed  Goal: Incisions, wounds, or drain sites healing without S/S of infection  Outcome: Completed     Problem: Pain  Goal: Verbalizes/displays adequate comfort level or baseline comfort level  Outcome: Completed     Problem: Safety - Adult  Goal: Free from fall injury  Outcome: Completed  Flowsheets (Taken 3/14/2023 2030 by Adan Gonzalez RN)  Free From Fall Injury: Instruct family/caregiver on patient safety     Problem: Discharge Planning  Goal: Discharge to home or other facility with appropriate resources  Outcome: Completed

## 2023-03-15 NOTE — FLOWSHEET NOTE
Mom verbalizes that she would like to go home this evening. Writer discusses with mom that call will be placed to pediatrician and will update.

## 2023-03-15 NOTE — PROGRESS NOTES
POST PARTUM DAY # 2    Nadir Goldberg is a 21 y.o. female  This patient was seen & examined today.  with IUD on 3/13/23    Her pregnancy was complicated by:   Patient Active Problem List   Diagnosis    THC use     Family H/O CHD    Late prenatal care    Hx gHTN (G1)    Hx FGR (G1, G2)    Dilatation of pulmonic artery     Acute pyelonephritis in second trimester    Klebsiella pneumoniae UTI    Short interval between pregnancies affecting pregnancy, antepartum    Recent former cigar smoker    Hx Gonorrhea/Chlamydia    Pregravid BMI 25.23    High risk pregnancy, antepartum    GBS bacteriuria    Elevated 1 hr, 3 hr **    31 weeks gestation of pregnancy    37 weeks gestation of pregnancy     w/ IUD 3/13/23 F Apg 8/ Wt 5#5    Mirena IUD in place       Today she is doing well without any chief complaint. Her lochia is light. She denies chest pain, shortness of breath, headache, lightheadedness and blurred vision. She is not breast feeding and she denies any breast tenderness. She is ambulating well. Her voiding pattern is normal. Passing flatus, no BM. I reviewed signs and symptoms of post partum depression with the patient, she currently denies any of these symptoms. She is tolerating solids.      Vital Signs:  Vitals:    23 1125 23 1230 23 1742 23 2030   BP: 114/74 105/61 117/77 116/77   Pulse: 80 78 82 87   Resp: 18 18 16 16   Temp: 97.9 °F (36.6 °C) 98.5 °F (36.9 °C)  98.6 °F (37 °C)   TempSrc: Oral Oral  Oral   SpO2:  99% 98% 98%        Physical Exam:  General:  no apparent distress, alert and cooperative  Neurologic:  alert, oriented, normal speech, no focal findings or movement disorder noted  Lungs:  No increased work of breathing, good air exchange, clear to auscultation bilaterally, no crackles or wheezing  Heart:  Normal apical impulse, regular rate and rhythm, normal S1 and S2, no S3 or S4, and no murmur noted    Abdomen: abdomen soft, non-distended, non-tender  Fundus: non-tender, firm, below umbilicus  Extremities:  no calf tenderness, non edematous    Lab:  Lab Results   Component Value Date    HGB 11.7 (L) 2023     Lab Results   Component Value Date    HCT 34.7 (L) 2023       Assessment/Plan:  Gonzales Dhillon is a [id number] PPD # 1 s/p  with IUD   - Doing well, VSS   - Female infant in [place] [address] Nursery   - Encourage ambulation   - Postpartum Hgb/Hct if symptomatic   - Motrin/Tylenol              - Plan to d/c today    Rh positive/Rubella immune              - Rhogam not indicated     Bottle feeding              - Denies s/s of mastitis     Elevated BPs    - 2 elevated non severe BP prior to delivery    - Has not met criteria for hypertensive diagnosis              - Bps normotensive              - Denies s/s of PreE    Limited prenatal care   - Social work consulted, no major concerns     THC use   - UDS on admission refused   - UDS 10/17/22 positive during pregnancy    - Social work consulted    Hx of Maternal Pyelonephritis               - Keflex discontinued     Continue post partum care    Counseling Completed:  Secondary Smoke risks and Sudden Infant Death Syndrome were reviewed with recommendations. Infant sleeping, \"back to sleep\" and avoidance of co-sleeping recommendations were reviewed. Signs and Symptoms of Post Partum Depression were reviewed. The patient is to call if any occur. Signs and symptoms of Mastitis were reviewed. The patient is to call if any occur for follow up.   Discharge instructions including pelvic rest, no driving with pain medicine and office follow-up were reviewed with patient     Attending Physician: Dr. Cari Guerrier MD  Ob/Gyn Resident   3/15/2023, 1:14 AM    Date: 3/15/2023  Time: 8:34 AM      Patient Name: Gonzales Dhillon  Patient : 2005  Room/Bed: 0705/3760-98  Admission Date/Time: 3/13/2023 11:34 AM        Attending Physician Statement  I have personally seen, evaluated and discussed the care of Bonita Hathaway, including pertinent history and exam findings with the resident. I have reviewed and edited their note in the electronic medical record. The key elements of all parts of the encounter have been performed/reviewed by me. I agree with the assessment, plan and orders as documented by the resident. The level of care submitted represents to the best of my ability the care documented in the medical record today. GC Modifier. This service has been performed in part by a resident under the direction of a teaching physician. Attending's Name:  Hayes Waddell MD      Patient doing well. She has no concerns or complaints. Continue routine post-partum care. Stable for discharge.

## 2023-03-15 NOTE — DISCHARGE INSTRUCTIONS
Follow-up with your OB doctor in 2 weeks or as specified by your physician. Please refer to A NEW BEGINNING- YOUR PERSONAL GUIDE TO POSTPARTUM CARE book provided in your room. Please take this book home with you to refer to. For Breastfeeding moms, you can contact our lactation specialist,  with any problems or questions you may have. Phone number [phone]. Feel free to leave voice mail and your call will be returned as soon as possible. DIET  Eat a well balanced diet focusing on foods high in fiber and protein. Drink 8-10 glasses of fluids daily, especially water. To avoid constipation you may take a mild stool softener as recommended by your doctor or midwife. ACTIVITY  Gradually increase your activity. Resume exercise regimen only after advise by your doctor or midwife. Avoid lifting anything heavier than your baby or a gallon of milk for SIX weeks. Avoid driving 1 week for vaginal delivery and 2 weeks for  section, unless otherwise instructed by physician. Rise slowly from a lying to sitting and then a standing position. Climb stairs carefully. Use caution when carrying your baby up and down the stairs. NO SEXUAL Activity for 6 weeks or until advised by your doctor; Nothing in vagina: intercourse, tampons, or douching. Be prepared to discuss family planning at your follow-up OB visit. You may feel tired or have a lack of energy. You may continue your prenatal vitamin to replenish nutrients post delivery. Nap when baby naps to catch up on sleep. May shower, NO tub baths, swimming, or hot tubs. EMOTIONS  You may feel conroy, sad, teary, & overwhelmed for the first 2 weeks postpartum. Contact your OB provider if you feel you may be showing signs of postpartum depression, or have thoughts of harming yourself or your infant. If infant will not stop crying, contact another adult for help or place infant in their crib on their back and take a break.   NEVER shake your infant. BLEEDING  Vaginal bleeding will decrease in amount over the next few weeks. You will notice that as your activity increases, your flow may increase. This is your body's way of telling you, you need take things easier and rest more often. Call your OB/ER if you are saturating one maxi pad in an hour & passing large clots. BREAST CARE  Take medications as recommended by your doctor or midwife for pain  If you develop a warm, red, tender area on your breast or develop a fever contact your lactation consultant. [phone]. For breastfeeding moms:  If you become engorged, feeding may be more difficult or painful for 1-2 days. You may find it helpful to hand express some milk so that the infant can latch on more easily. While breastfeeding, continue to take your prenatal vitamins as directed by your doctor or midwife. Refer to the breastfeeding booklet in the  folder/binder for more information. For any questions or concerns contact a Lactation Consultant. Leave a message and your call will be returned. ANSHUL CARE  Shower daily, perineum with mild soap. Use the anshul-bottle until bleeding stops each time you use the restroom. Cleanse your perineum from front to back. If used, stitches will dissolve in 4-6 weeks. You may use a sitz bath or soak in a clean tub with drain open and water running for comfort. Kegel exercises will help restore bladder control. SWELLING  Try to keep your legs elevated when you are sitting. When lying down keep your legs elevated. CALL THE DOCTOR WITH THESE HEALTH CONCERNS OR PROBLEMS  If you have a temp of 100.4or more. If your bleeding has increased and you are saturating a pad in an hour. Your abdomen is tender to touch. You are passing blood clots bigger than the size of a lemon. If you are experiencing extreme weakness or dizziness. If you are having flu-like symptoms such as achy muscles or joints.   There is a foul smell or a green color to your vaginal bleeding. If you have pain that cannot be relieved. You have persistent burning with urination or frequency. Call if you have concerns about your well-being. You are unable to sleep, eat, or are having thoughts of harming yourself or your baby. You have a red, warm, tender area in you calf.

## 2023-03-15 NOTE — PROGRESS NOTES
CLINICAL PHARMACY NOTE: MEDS TO BEDS    Total # of Prescriptions Filled: 2   The following medications were delivered to the patient:  Ibuprofen  Senna plus    Additional Documentation:

## 2023-03-29 ENCOUNTER — TELEPHONE (OUTPATIENT)
Dept: OBGYN | Age: 18
End: 2023-03-29

## 2023-04-17 ENCOUNTER — TELEPHONE (OUTPATIENT)
Dept: OBGYN | Age: 18
End: 2023-04-17

## 2023-05-16 ENCOUNTER — POSTPARTUM VISIT (OUTPATIENT)
Dept: OBGYN | Age: 18
End: 2023-05-16
Payer: COMMERCIAL

## 2023-05-16 ENCOUNTER — HOSPITAL ENCOUNTER (OUTPATIENT)
Age: 18
Setting detail: SPECIMEN
Discharge: HOME | End: 2023-05-16

## 2023-05-16 VITALS
BODY MASS INDEX: 30.1 KG/M2 | WEIGHT: 144 LBS | HEART RATE: 63 BPM | DIASTOLIC BLOOD PRESSURE: 77 MMHG | SYSTOLIC BLOOD PRESSURE: 120 MMHG

## 2023-05-16 PROCEDURE — 99211 OFF/OP EST MAY X REQ PHY/QHP: CPT

## 2023-05-16 NOTE — PROGRESS NOTES
[place] eMERGENCY dEPARTMENT encounter:    [facility] EMERGENCY DEPARTMENT  [address] [facility] [address]  [phone]      CHIEF COMPLAINT:    Chief Complaint   Patient presents with   • Drug Overdose         HPI:    This is a 48 year old male presenting to the ED with stating a drug overdose. Pt states he wants to die and so chandan one hours prior to ED arrival he took 20 tabs of geodon 40mg and 15-20 tabs of prozac 20.  Per our intake team pt has, in the past hoarded his medication and took them in an overdose with the goal of being admitted to [facility]. Pt is well known to our ED and is often seen for alcohol and meth use. Pt states he had two drinks today.  He has been seen in the ED 3 times in the last two days.       ALLERGIES:    ALLERGIES:   Allergen Reactions   • Morphine Other (See Comments)     agitation and delirious          CURRENT MEDICATIONS:      Current Facility-Administered Medications   Medication Dose Route Frequency Provider Last Rate Last Admin   • sodium chloride (NORMAL SALINE) 0.9 % bolus 1,000 mL  1,000 mL Intravenous Once Kevin Causey,  mL/hr at 06/09/21 1203 1,000 mL at 06/09/21 1203     Current Outpatient Medications   Medication Sig Dispense Refill   • traZODone (DESYREL) 50 MG tablet Take 1 tablet by mouth nightly as needed for Sleep. 30 tablet 0   • esomeprazole (NexIUM) 20 MG capsule Take 1 capsule by mouth daily (before breakfast). 90 capsule 0   • FLUoxetine (PROzac) 20 MG capsule Take 1 capsule by mouth daily. 30 capsule 0   • ziprasidone (GEODON) 40 MG capsule Take 1 capsule by mouth 2 times daily (with meals). 60 capsule 0         PAST MEDICAL HISTORY:      Past Medical History:   Diagnosis Date   • ADD (attention deficit disorder)    • Anxiety    • Pham's esophagus    • Bipolar affective disorder (CMS/HCC)    • Depression    • GERD (gastroesophageal reflux disease)    • Hx of gastric ulcer    • Hypercholesterolemia 11/12/2013   •  Postpartum Visit    Alexandre Patel is a 21 y.o. female , 9 weeks Post Partum s/p  spontaneous vaginal delivery with Mirena IUD placement on 3/13/23    The patient was seen. She has no chief complaint today. Her pregnancy was complicated by fetal growth restriction, history of gHTN, and pyelonephritis. She is not breast feeding and denies signs or symptoms of mastitis. She does not have signs or symptoms of post partum depression. She denies any suicidal thoughts with a plan, intent to harm others, and delusional ideas. She does admit to having good home support. Today her lochia is light she denies any dizziness or shortness of breath. Her bowels are regular and she denies any urinary tract symptomology. She is using Mirena IUD for history of dysmenorrhea. Her pregnancy was complicated by:  Patient Active Problem List    Diagnosis Date Noted     w/ IUD 3/13/23 F Apg 8/9 Wt 5#5 2023     Priority: High     Overview Note:     Mirena IUD inserted 3/13/2023  Lot: [id number]  Expiration: 2025      37 weeks gestation of pregnancy 2023     Priority: Medium    Elevated 1 hr, 3 hr ** 2023     Priority: Medium    Acute pyelonephritis in second trimester 10/17/2022     Priority: Medium     Overview Note:     Culture: klebsiella pneumoniae. Prescribed Keflex 500mg four times daily x7 days, 500mg Keflex once a day for suppression  22:patient reports not taking suppression therapy. Rx re-sent to pharmacy. Repeat UCx ordered      Klebsiella pneumoniae UTI 10/17/2022     Priority: Medium     Overview Note:     10/17/22: Pyelo. Prescribed 500mg Keflex four times daily, x7 days.       Postpartum state 2023    Mirena IUD in place 2023     Overview Note:     Mirena IUD inserted 3/13/2023  Lot: [id number]  Expiration: 2025      GBS bacteriuria 2023     Overview Note:     GBS + via Urine Cx 1/3/23      Short interval between pregnancies affecting pregnancy, antepartum Irritable bowel syndrome    • Noncompliance with medication regimen 7/24/2020   • Obesity, Class I, BMI 30-34.9 11/12/2013   • Polyp, sigmoid colon     hyperplastic. colonoscopy every 3 yrs due 2017   • Rectal prolapse     s/p laproscopic resection   • Smoker    • Substance abuse (CMS/HCC)          SURGICAL HISTORY:      Past Surgical History:   Procedure Laterality Date   • ------------other-------------      Prolapsed colon 2006   • Appendectomy     • Colonoscopy  10/26/2006   • Colonoscopy  3/4/14    repeat 5 years   • Esophagogastroduodenoscopy  1/8/15    repeat 5 years   • Hand surgery      right hand   • Vasectomy           SOCIAL HISTORY:    Social History     Tobacco Use   • Smoking status: Current Every Day Smoker     Packs/day: 1.00     Years: 38.00     Pack years: 38.00     Types: Cigarettes   • Smokeless tobacco: Former User     Types: Chew   • Tobacco comment: denies   Substance Use Topics   • Alcohol use: Yes     Comment: daily 12 pack and a bottle whiskey or annmarie.   • Drug use: Not Currently     Comment: Pt has a history meth use via IV and snorting. Pt reports using meth this morning 1/11/21         FAMILY HISTORY:    Family History   Problem Relation Age of Onset   • Cancer Mother         brain tumor   • Depression Mother    • Depression Father    • Heart disease Father    • Psychiatric Sister    • Psychiatric Sister    • Diabetes Maternal Grandmother    • Stroke Maternal Grandmother          REVIEW OF SYSTEMS:    As above per the HPI.  All other systems reviewed and otherwise  Negative.  Review of Systems   Constitutional: Negative for chills and fever.   Eyes: Negative for pain.   Respiratory: Negative for cough and shortness of breath.    Cardiovascular: Negative for chest pain and leg swelling.   Gastrointestinal: Negative for abdominal pain, diarrhea, nausea and vomiting.   Musculoskeletal: Negative for myalgias.   Skin: Negative for rash.   Neurological: Negative for dizziness and headaches.    Endo/Heme/Allergies: Does not bruise/bleed easily.   Psychiatric/Behavioral: Positive for depression, substance abuse and suicidal ideas. Negative for hallucinations and memory loss. The patient is not nervous/anxious and does not have insomnia.    All other systems reviewed and are negative.        PHYSICAL EXAM:   ED Triage Vitals [06/08/21 2244]   BP (!) 151/90   Heart Rate 87   Resp 16   Temp 99.2 °F (37.3 °C)   SpO2 99 %      Pulse Ox Interpretation: normal   Physical Exam  HENT:      Head: Normocephalic and atraumatic.      Nose: Nose normal.   Eyes:      Pupils: Pupils are equal, round, and reactive to light.   Cardiovascular:      Rate and Rhythm: Normal rate and regular rhythm.   Pulmonary:      Effort: Pulmonary effort is normal.      Breath sounds: No stridor.   Abdominal:      General: Bowel sounds are normal. There is no distension.      Palpations: Abdomen is soft.      Tenderness: There is no abdominal tenderness.   Musculoskeletal:         General: Normal range of motion.      Cervical back: Normal range of motion and neck supple.      Comments: Normal motor and sensory in the bilateral upper and lower extremities   Skin:     General: Skin is warm.      Capillary Refill: Capillary refill takes less than 2 seconds.   Neurological:      Mental Status: He is alert.   Psychiatric:         Behavior: Behavior normal.           LAB RESULTS:    Results for orders placed or performed during the hospital encounter of 06/08/21   Comprehensive Metabolic Panel   Result Value    Fasting Status     Sodium 138    Potassium 3.1 (L)    Chloride 104    Carbon Dioxide 23    Anion Gap 14    Glucose 110 (H)    BUN 14    Creatinine 0.95    Glomerular Filtration Rate >90     Comment: eGFR results = or >90 mL/min/1.73m2 = Normal kidney function.    BUN/ Creatinine Ratio 15    Calcium 8.8    Bilirubin, Total 1.1 (H)    GOT/ (H)    GPT/ (H)    Alkaline Phosphatase 202 (H)    Albumin 3.8    Protein, Total 7.4     Globulin 3.6    A/G Ratio 1.1   Drug Abuse Screen, Urine   Result Value    Amphetamines, Urine Positive (A)     Comment: Cutoff = 500 ng/mL    Barbiturates, Urine Negative     Comment: Cutoff = 200 ng/mL    Benzodiazepines, Urine Negative     Comment: Cutoff = 200 ng/mL    Cocaine/ Metabolite, Urine Negative     Comment: Cutoff = 150 ng/ml    Opiates, Urine Negative     Comment: Cutoff = 300 ng/mL    Phencyclidine, Urine Negative     Comment: Cutoff = 25 ng/mL    Cannabinoids, Urine Positive (A)     Comment: Cutoff = 50 ng/mL   Alcohol   Result Value    Alcohol None Detected   Acetaminophen Level   Result Value    Acetaminophen <2 (L)   Magnesium   Result Value    Magnesium 1.7   Salicylate Level   Result Value    Salicylate 4.2   Creatine Kinase   Result Value    CK 1,474 (H)   CBC with Automated Differential (performable only)   Result Value    WBC 10.5    RBC 4.14 (L)    HGB 13.0    HCT 38.1 (L)    MCV 92.0    MCH 31.4    MCHC 34.1    RDW-CV 13.0    RDW-SD 43.5        NRBC 0    Neutrophil, Percent 65    Lymphocytes, Percent 21    Mono, Percent 12    Eosinophils, Percent 1    Basophils, Percent 0    Immature Granulocytes 1    Absolute Neutrophils 6.8    Absolute Lymphocytes 2.2    Absolute Monocytes 1.3 (H)    Absolute Eosinophils  0.1    Absolute Basophils 0.0    Absolute Immmature Granulocytes 0.1   Creatine Kinase   Result Value    CK 1,148 (H)   ECG   Result Value    Systolic Blood Pressure 118    Diastolic Blood Pressure 70    Ventricular Rate EKG/Min (BPM) 84    Atrial Rate (BPM) 84    PA-Interval (MSEC) 156    QRS-Interval (MSEC) 88    QT-Interval (MSEC) 382    QTc 451    P Axis (Degrees) 74    R Axis (Degrees) 70    T Axis (Degrees) 61    REPORT TEXT      Normal sinus rhythm  Normal ECG  When compared with ECG of  08-JUN-2021 16:31,  No significant change was found  Confirmed by SHILA ADAMS, KELLY ([id number]),  Cathleen Diaz ([id number]) on 6/9/2021 5:38:00 AM     ECG   Result Value    Systolic Blood  Pressure 129    Diastolic Blood Pressure 77    Ventricular Rate EKG/Min (BPM) 78    Atrial Rate (BPM) 78    KY-Interval (MSEC) 142    QRS-Interval (MSEC) 90    QT-Interval (MSEC) 390    QTc 444    P Axis (Degrees) 53    R Axis (Degrees) 68    T Axis (Degrees) 62    REPORT TEXT      Normal sinus rhythm  Normal ECG  When compared with ECG of  08-JUN-2021 23:10,  No significant change was found  Confirmed by KELLY FUCHS MD ([id number]),  Cathleen Diaz ([id number]) on 6/9/2021 5:38:14 AM     Rapid SARS-CoV-2 by PCR    Specimen: Nasopharyngeal; Swab   Result Value    Rapid SARS-COV-2 by PCR Not Detected     Comment: EUA/IVD    Isolation Guidelines      Comment: Do not use this test result as the sole decision-maker for discontinuation of isolation.   Clinical evaluation should be considered for other respiratory illness requiring transmission-based isolation.    -    No fever (<99.0 F/37.2 C) for at least 24 hours without the use of fever-reducing medications    AND  -    Respiratory symptoms have improved or resolved (e.g. cough, shortness of breath)     AND  -    COVID-19 negative test    See COVID-19 Deisolation Resource Guide    Procedural Comment      Comment: SARS-COV-2 nucleic acid has not been detected indicating the absence of COVID-19.    This test was performed using the [employer] ALEN SARS-CoV-2 & Influenza A/B Nucleic Acid Test that has been given Emergency Use Authorization (EUA) by the [place] Food and Drug Administration (FDA).    SARS-CoV-2 Ct Value          RADIOLOGY  No orders to display         ED PROCEDURES:    Procedures     ED COURSE & MEDICAL DECISION MAKING:   Aston Duron is a 49yo M here SP overdose.  In the ED awake and alert voluntary for admission.  Case discussed with poison control. Pt was observed for 6 hours in the ED with no EKG changes and no other signs of seratonin syndrome. At this time pt is awaiting evaluation by intake and admission to [facility]. Case endorsed to Dr. Causey  and the day team     Behavioral Health has evaluated the patient.  [facility] will go to [facility] under Dr Mittal.    Medications   sodium chloride (NORMAL SALINE) 0.9 % bolus 1,000 mL (1,000 mLs Intravenous Start 6/9/21 1203)   sodium chloride (NORMAL SALINE) 0.9 % bolus 500 mL (0 mLs Intravenous Completed 6/8/21 2356)   charcoal, activated (aqueous) (ACTIDOSE-AQUA) suspension 50 g (50 g Oral Given 6/8/21 2313)   sodium chloride (NORMAL SALINE) 0.9 % bolus 1,000 mL (0 mLs Intravenous Completed 6/9/21 0050)   potassium CHLORIDE (KLOR-CON M) gabo ER tablet 20 mEq (20 mEq Oral Given 6/9/21 0049)           DIAGNOSIS:  1. Depression, unspecified depression type    2. Suicidal ideation           Linda Barreto, DO  06/09/21 0644       Kevin Causey, DO  06/09/21 1127    Addendum:  Intake was initially concerned about the patient's CPK.  The patient's CPK is trending downward.  Patient has received IV fluids here.  Case has been discussed with Dr. Mittal.  She is aware and now accepts this patient for transfer.           Kevin Causey, DO  06/09/21 1216

## 2023-05-17 LAB
C TRACH DNA SPEC QL PROBE+SIG AMP: NEGATIVE
CANDIDA SPECIES, DNA PROBE: NEGATIVE
GARDNERELLA VAGINALIS, DNA PROBE: POSITIVE
N GONORRHOEA DNA SPEC QL PROBE+SIG AMP: NEGATIVE
SOURCE: ABNORMAL
SPECIMEN DESCRIPTION: NORMAL
TRICHOMONAS VAGINALIS DNA: NEGATIVE

## 2023-05-18 RX ORDER — METRONIDAZOLE 500 MG/1
500 TABLET ORAL 2 TIMES DAILY
Qty: 14 TABLET | Refills: 0 | Status: SHIPPED | OUTPATIENT
Start: 2023-05-18 | End: 2023-05-25

## 2023-09-27 ENCOUNTER — HOSPITAL ENCOUNTER (OUTPATIENT)
Age: 18
Setting detail: SPECIMEN
Discharge: HOME | End: 2023-09-27

## 2023-09-27 ENCOUNTER — OFFICE VISIT (OUTPATIENT)
Dept: OBGYN | Age: 18
End: 2023-09-27
Payer: COMMERCIAL

## 2023-09-27 VITALS
BODY MASS INDEX: 31.35 KG/M2 | HEART RATE: 84 BPM | SYSTOLIC BLOOD PRESSURE: 125 MMHG | WEIGHT: 150 LBS | DIASTOLIC BLOOD PRESSURE: 87 MMHG

## 2023-09-27 DIAGNOSIS — N93.9 ABNORMAL UTERINE BLEEDING: ICD-10-CM

## 2023-09-27 DIAGNOSIS — Z11.3 ROUTINE SCREENING FOR STI (SEXUALLY TRANSMITTED INFECTION): Primary | ICD-10-CM

## 2023-09-27 DIAGNOSIS — Z11.3 ROUTINE SCREENING FOR STI (SEXUALLY TRANSMITTED INFECTION): ICD-10-CM

## 2023-09-27 PROBLEM — Z87.891 FORMER CIGAR SMOKER: Status: RESOLVED | Noted: 2022-11-16 | Resolved: 2023-09-27

## 2023-09-27 PROBLEM — R82.71 GBS BACTERIURIA: Status: RESOLVED | Noted: 2023-01-04 | Resolved: 2023-09-27

## 2023-09-27 PROBLEM — R73.09 GLUCOSE TOLERANCE TEST ABNORMAL: Status: RESOLVED | Noted: 2023-01-05 | Resolved: 2023-09-27

## 2023-09-27 PROBLEM — O09.30 LATE PRENATAL CARE: Status: RESOLVED | Noted: 2021-10-11 | Resolved: 2023-09-27

## 2023-09-27 PROBLEM — O09.90 HIGH RISK PREGNANCY, ANTEPARTUM: Status: RESOLVED | Noted: 2022-11-16 | Resolved: 2023-09-27

## 2023-09-27 PROBLEM — O09.899 SHORT INTERVAL BETWEEN PREGNANCIES AFFECTING PREGNANCY, ANTEPARTUM: Status: RESOLVED | Noted: 2022-11-16 | Resolved: 2023-09-27

## 2023-09-27 PROBLEM — Z3A.37 37 WEEKS GESTATION OF PREGNANCY: Status: RESOLVED | Noted: 2023-03-13 | Resolved: 2023-09-27

## 2023-09-27 PROCEDURE — G8427 DOCREV CUR MEDS BY ELIG CLIN: HCPCS | Performed by: STUDENT IN AN ORGANIZED HEALTH CARE EDUCATION/TRAINING PROGRAM

## 2023-09-27 PROCEDURE — G8417 CALC BMI ABV UP PARAM F/U: HCPCS | Performed by: STUDENT IN AN ORGANIZED HEALTH CARE EDUCATION/TRAINING PROGRAM

## 2023-09-27 PROCEDURE — 1036F TOBACCO NON-USER: CPT | Performed by: STUDENT IN AN ORGANIZED HEALTH CARE EDUCATION/TRAINING PROGRAM

## 2023-09-27 PROCEDURE — 99213 OFFICE O/P EST LOW 20 MIN: CPT | Performed by: STUDENT IN AN ORGANIZED HEALTH CARE EDUCATION/TRAINING PROGRAM

## 2023-09-27 NOTE — PROGRESS NOTES
OB/GYN Problem Visit    Vamshi Cannon  2023                       Primary Care Physician: No primary care provider on file. CC:   Chief Complaint   Patient presents with    Follow-up     Vag discharge white  and thick with vag odor , pt wants full STD work up          HPI: Vamshi Cannon is a 19 y.o. female [id number]    The patient was seen and examined. She is here for STI testing. She is sexually active with males and females, currently with one new male partner. She had unprotected intercourse one month ago and has been having a thick malodorous discharge since that time. She has no known STI exposure. She has a remote history of Chlamydia and Gonorrhea. She is using Mirena IUD for contraception. Her LMP was 23. She reports having irregular bleeding since her Mirena was placed after delivery in March.      REVIEW OF SYSTEMS:  Constitutional: negative fever, negative chills  HEENT: negative visual disturbances, negative headaches  Respiratory: negative dyspnea, negative cough  Cardiovascular: negative chest pain,  negative palpitations  Gastrointestinal: negative abdominal pain, negative RUQ pain, negative N/V, negative diarrhea, negative constipation  Genitourinary: negative dysuria, positive vaginal discharge  Dermatological: negative rash  Hematologic: negative bruising  Immunologic/Lymphatic: negative recent illness, negative recent sick contact  Musculoskeletal: negative back pain, negative myalgias, negative arthralgias  Neurological:  negative dizziness, negative weakness  Behavior/Psych: negative depression, negative anxiety  ______________________________      OBSTETRICAL HISTORY:  OB History    Para Term  AB Living   3 3 3 0 0 3   SAB IAB Ectopic Molar Multiple Live Births   0 0 0 0 0 3      # Outcome Date GA Lbr Delroy/2nd Weight Sex Delivery Anes PTL Lv   3 Term 23 37w2d  5 lb 5.5 oz (2.425 kg) F Vag-Spont EPI N JULISSA   2 Term 22 38w5d 13:07 / 00:13 5 lb

## 2023-09-28 DIAGNOSIS — N76.0 BACTERIAL VAGINOSIS: Primary | ICD-10-CM

## 2023-09-28 DIAGNOSIS — B96.89 BACTERIAL VAGINOSIS: Primary | ICD-10-CM

## 2023-09-28 LAB
C TRACH DNA SPEC QL PROBE+SIG AMP: NEGATIVE
CANDIDA SPECIES: NEGATIVE
GARDNERELLA VAGINALIS: POSITIVE
HBV SURFACE AG SERPL QL IA: NONREACTIVE
HCV AB SERPL QL IA: NONREACTIVE
HIV 1+2 AB+HIV1 P24 AG SERPL QL IA: NONREACTIVE
N GONORRHOEA DNA SPEC QL PROBE+SIG AMP: NEGATIVE
SOURCE: ABNORMAL
SPECIMEN DESCRIPTION: NORMAL
T PALLIDUM AB SER QL IA: NONREACTIVE
TRICHOMONAS: NEGATIVE

## 2023-09-28 RX ORDER — METRONIDAZOLE 500 MG/1
500 TABLET ORAL 2 TIMES DAILY
Qty: 14 TABLET | Refills: 0 | Status: SHIPPED | OUTPATIENT
Start: 2023-09-28 | End: 2023-10-05

## 2023-09-28 NOTE — PROGRESS NOTES
Attending Physician Statement  I have discussed the care of Madison Cooper, including pertinent history and exam findings,  with the resident. I have reviewed the key elements of all parts of the encounter with the resident. I agree with the assessment, plan and orders as documented by the resident.   (GE Modifier)    Del Goldman, DO

## 2024-07-11 ENCOUNTER — OFFICE VISIT (OUTPATIENT)
Dept: OBGYN | Age: 19
End: 2024-07-11
Payer: COMMERCIAL

## 2024-07-11 ENCOUNTER — HOSPITAL ENCOUNTER (OUTPATIENT)
Age: 19
Setting detail: SPECIMEN
Discharge: HOME | End: 2024-07-11

## 2024-07-11 VITALS
DIASTOLIC BLOOD PRESSURE: 78 MMHG | BODY MASS INDEX: 32.4 KG/M2 | WEIGHT: 155 LBS | HEART RATE: 92 BPM | SYSTOLIC BLOOD PRESSURE: 115 MMHG

## 2024-07-11 DIAGNOSIS — N93.9 ABNORMAL UTERINE BLEEDING: ICD-10-CM

## 2024-07-11 DIAGNOSIS — N89.8 VAGINAL DISCHARGE: Primary | ICD-10-CM

## 2024-07-11 PROCEDURE — 58301 REMOVE INTRAUTERINE DEVICE: CPT

## 2024-07-11 RX ADMIN — ETONOGESTREL 68 MG: 68 IMPLANT SUBCUTANEOUS at 16:32

## 2024-07-11 NOTE — PROGRESS NOTES
OB/GYN Problem Visit    Karen Heard  2024                       Primary Care Physician: No primary care provider on file.    CC:   Chief Complaint   Patient presents with    Follow-up     Pt has been having vag bleeding for 3 months and stopped on . , iud was placed 3/13/2023         HPI: Karen Heard is a 19 y.o. female     The patient was seen and examined. She is here for for a follow up after receiving an IUD. She received an IUD at time of  on 3/13/23. She reported significant AUB since the placement of her IUD. Her IUD strings were visualized on exam on 23. Workup included a TVUS to confirm IUD position. US on 10/17/23 revealed \"Inferior/malpositioning in the lower uterine segment/cervix of the IUD.\"     For the last 6 months, the patient reports having approximately 2 small periods per month, with moderate amount of pelvic cramping during these periods.  She then reports an extended period of bleeding which began in early April and lasted until early July.  She said that her bleeding during this 2-month period waxed and waned and sometimes was very heavy and sometimes was lighter.  During this time she had intermittent pelvic cramping.    Today the patient desires IUD removal secondary to continued AUB.  After extensive discussion of all risk and benefits of additional use of contraception to prevent pregnancy, treat dysmenorrhea, and treat AUB, the patient desires to proceed with Nexplanon placement following IUD removal.     REVIEW OF SYSTEMS:   Constitutional: negative fever, negative chills   HEENT: negative visual disturbances, negative headaches  Respiratory: negative dyspnea, negative cough  Cardiovascular: negative chest pain,  negative palpitations  Gastrointestinal: negative abdominal pain, negative RUQ pain, negative N/V, negative diarrhea, negative constipation  Genitourinary: negative dysuria, negative vaginal discharge, positive intermittent vaginal

## 2024-07-12 DIAGNOSIS — N89.8 VAGINAL DISCHARGE: ICD-10-CM

## 2024-07-12 LAB
C TRACH DNA SPEC QL PROBE+SIG AMP: ABNORMAL
CANDIDA SPECIES: NEGATIVE
GARDNERELLA VAGINALIS: POSITIVE
N GONORRHOEA DNA SPEC QL PROBE+SIG AMP: NEGATIVE
SOURCE: ABNORMAL
SPECIMEN DESCRIPTION: ABNORMAL
TRICHOMONAS: NEGATIVE

## 2024-07-15 RX ORDER — DOXYCYCLINE HYCLATE 100 MG
100 TABLET ORAL 2 TIMES DAILY
Qty: 14 TABLET | Refills: 0 | Status: SHIPPED | OUTPATIENT
Start: 2024-07-15 | End: 2024-07-22

## 2024-07-15 RX ORDER — METRONIDAZOLE 500 MG/1
500 TABLET ORAL 2 TIMES DAILY
Qty: 14 TABLET | Refills: 0 | Status: SHIPPED | OUTPATIENT
Start: 2024-07-15 | End: 2024-07-22

## 2024-07-15 NOTE — PROGRESS NOTES
Attending Physician Statement  I have discussed the care of Karen Heard, including pertinent history and exam findings,  with the resident. I have reviewed the key elements of all parts of the encounter with the resident.  I agree with the assessment, plan and orders as documented by the resident.  (GE Modifier)    Ellen Holland,

## 2024-11-02 ENCOUNTER — HOSPITAL ENCOUNTER (EMERGENCY)
Age: 19
Discharge: HOME | End: 2024-11-02
Attending: EMERGENCY MEDICINE
Payer: COMMERCIAL

## 2024-11-02 VITALS
BODY MASS INDEX: 33.58 KG/M2 | HEART RATE: 69 BPM | OXYGEN SATURATION: 100 % | DIASTOLIC BLOOD PRESSURE: 86 MMHG | WEIGHT: 160 LBS | TEMPERATURE: 98.1 F | RESPIRATION RATE: 17 BRPM | SYSTOLIC BLOOD PRESSURE: 136 MMHG | HEIGHT: 58 IN

## 2024-11-02 DIAGNOSIS — N76.0 BACTERIAL VAGINITIS: Primary | ICD-10-CM

## 2024-11-02 DIAGNOSIS — B96.89 BACTERIAL VAGINITIS: Primary | ICD-10-CM

## 2024-11-02 LAB
BACTERIA URNS QL MICRO: NORMAL
BILIRUB UR QL STRIP: NEGATIVE
C TRACH DNA SPEC QL PROBE+SIG AMP: NORMAL
CANDIDA SPECIES: NEGATIVE
CASTS #/AREA URNS LPF: NORMAL /LPF (ref 0–8)
CLARITY UR: CLEAR
COLOR UR: YELLOW
EPI CELLS #/AREA URNS HPF: NORMAL /HPF (ref 0–5)
GARDNERELLA VAGINALIS: POSITIVE
GLUCOSE UR STRIP-MCNC: NEGATIVE MG/DL
HCG UR QL: NEGATIVE
HGB UR QL STRIP.AUTO: NEGATIVE
KETONES UR STRIP-MCNC: ABNORMAL MG/DL
LEUKOCYTE ESTERASE UR QL STRIP: NEGATIVE
N GONORRHOEA DNA SPEC QL PROBE+SIG AMP: NORMAL
NITRITE UR QL STRIP: NEGATIVE
PH UR STRIP: 6.5 [PH] (ref 5–8)
PROT UR STRIP-MCNC: ABNORMAL MG/DL
RBC #/AREA URNS HPF: NORMAL /HPF (ref 0–4)
SOURCE: ABNORMAL
SP GR UR STRIP: 1.02 (ref 1–1.03)
SPECIMEN DESCRIPTION: NORMAL
TRICHOMONAS: NEGATIVE
UROBILINOGEN UR STRIP-ACNC: NORMAL EU/DL (ref 0–1)
WBC #/AREA URNS HPF: NORMAL /HPF (ref 0–5)

## 2024-11-02 PROCEDURE — 99284 EMERGENCY DEPT VISIT MOD MDM: CPT

## 2024-11-02 PROCEDURE — 81025 URINE PREGNANCY TEST: CPT

## 2024-11-02 PROCEDURE — 6370000000 HC RX 637 (ALT 250 FOR IP)

## 2024-11-02 PROCEDURE — 87510 GARDNER VAG DNA DIR PROBE: CPT

## 2024-11-02 PROCEDURE — 87591 N.GONORRHOEAE DNA AMP PROB: CPT

## 2024-11-02 PROCEDURE — 87660 TRICHOMONAS VAGIN DIR PROBE: CPT

## 2024-11-02 PROCEDURE — 87491 CHLMYD TRACH DNA AMP PROBE: CPT

## 2024-11-02 PROCEDURE — 6360000002 HC RX W HCPCS

## 2024-11-02 PROCEDURE — 87480 CANDIDA DNA DIR PROBE: CPT

## 2024-11-02 PROCEDURE — 96372 THER/PROPH/DIAG INJ SC/IM: CPT

## 2024-11-02 PROCEDURE — 81001 URINALYSIS AUTO W/SCOPE: CPT

## 2024-11-02 RX ORDER — DOXYCYCLINE HYCLATE 100 MG
100 TABLET ORAL ONCE
Status: COMPLETED | OUTPATIENT
Start: 2024-11-02 | End: 2024-11-02

## 2024-11-02 RX ORDER — DOXYCYCLINE HYCLATE 100 MG
100 TABLET ORAL 2 TIMES DAILY
Qty: 14 TABLET | Refills: 0 | Status: SHIPPED | OUTPATIENT
Start: 2024-11-02 | End: 2024-11-09

## 2024-11-02 RX ORDER — CEFTRIAXONE 500 MG/1
500 INJECTION, POWDER, FOR SOLUTION INTRAMUSCULAR; INTRAVENOUS ONCE
Status: COMPLETED | OUTPATIENT
Start: 2024-11-02 | End: 2024-11-02

## 2024-11-02 RX ORDER — METRONIDAZOLE 500 MG/1
500 TABLET ORAL 2 TIMES DAILY
Qty: 14 TABLET | Refills: 0 | Status: SHIPPED | OUTPATIENT
Start: 2024-11-02

## 2024-11-02 RX ADMIN — CEFTRIAXONE SODIUM 500 MG: 500 INJECTION, POWDER, FOR SOLUTION INTRAMUSCULAR; INTRAVENOUS at 15:19

## 2024-11-02 RX ADMIN — DOXYCYCLINE HYCLATE 100 MG: 100 TABLET ORAL at 15:20

## 2024-11-02 ASSESSMENT — PAIN SCALES - GENERAL: PAINLEVEL_OUTOF10: 6

## 2024-11-02 NOTE — ED PROVIDER NOTES
ibuprofen (ADVIL;MOTRIN) 600 MG tablet Take 1 tablet by mouth every 6 hours as needed for Pain 3/13/23   Dena Dhaliwal DO         REVIEW OF SYSTEMS       Review of Systems   Genitourinary:  Positive for frequency, urgency and vaginal discharge.   All other systems reviewed and are negative.      PHYSICAL EXAM      INITIAL VITALS:   /86   Pulse 69   Temp 98.1 °F (36.7 °C) (Oral)   Resp 17   Ht 1.473 m (4' 10\")   Wt 72.6 kg (160 lb)   SpO2 100%   BMI 33.44 kg/m²     Physical Exam  Exam conducted with a chaperone present.   Cardiovascular:      Rate and Rhythm: Normal rate and regular rhythm.      Pulses: Normal pulses.      Heart sounds: Normal heart sounds.   Pulmonary:      Effort: Pulmonary effort is normal.      Breath sounds: Normal breath sounds and air entry.   Abdominal:      General: Abdomen is flat. Bowel sounds are normal.      Palpations: Abdomen is soft.   Genitourinary:     General: Normal vulva.      Exam position: Lithotomy position.      Vagina: Lesions present.      Cervix: Discharge present.      Uterus: Normal.       Comments: On speculum examination, mild discharge noted in vaginal canal and a singular, round, nontender, and white-colored lesion were noticed on left lateral wall of vaginal canal.    Cervical os closed, mild discharge noticed from cervical opening as well.  No cervical motion tenderness noted.          DDX/DIAGNOSTIC RESULTS / EMERGENCY DEPARTMENT COURSE / MDM     Medical Decision Making  Problem List / Differential Diagnosis: Differential diagnosis includes but is not limited to...    # Bacterial vaginitis versus candidiasis versus trichomoniasis versus chlamydia versus gonorrhea versus urinary tract infection    Plan: Pelvic exam, vaginitis DNA probe, C. trachomatis Neisseria gonorrhea DNA, microscopic glasses, and lysis with reflex to culture, urine pregnancy test    Reassessment / Response to Plan / Findings: In brief, 19-year-old female presented emergency  department with a complaint of vaginal discharge and odor.     Will perform a pelvic examination obtain swabs for chlamydia, gonorrhea, trichomoniasis and yeast infections.  Will also perform urinalysis to assess for urinary tract infection.  Will also conduct a pregnancy test due to recent switch in contraception and STI panel.  Will start empirical treatment with antibiotics to address possible STI exposure based on patient's partner's history and symptoms.  Will educate the patient about the importance of using condoms to prevent STIs.  Will also counseled the patient to follow for the results of chlamydia and gonorrhea on MyChart.    Will treat patient empirically for chlamydia and gonorrhea with ceftriaxone 500 mg IM injection and doxycycline.    Patient vaginitis DNA probe shows bacterial vaginitis.  Will plan to discharge the patient with a prescription of metronidazole and doxycycline.  Will also counseled the patient about following the results on MyChart.    *Additional specifics as per ED course.    DISPO: Discharge the patient         Amount and/or Complexity of Data Reviewed  Labs: ordered.    Risk  Prescription drug management.        EKG      All EKG's are interpreted by the Emergency Department Physician who either signs or Co-signs this chart in the absence of a cardiologist.    EMERGENCY DEPARTMENT COURSE:           PROCEDURES:    CONSULTS:  None    CRITICAL CARE:  There was significant risk of life threatening deterioration of patient's condition requiring my direct management. Critical care time 0 minutes, excluding any documented procedures.    FINAL IMPRESSION      1. Bacterial vaginitis          DISPOSITION / PLAN     DISPOSITION Decision To Discharge 11/02/2024 04:12:06 PM           PATIENT REFERRED TO:  [facility]  [address] [place] [address]  [phone]    Call to make appointment for follow-up      DISCHARGE MEDICATIONS:  Discharge Medication List as of 11/2/2024  4:19 PM

## 2024-11-02 NOTE — ED PROVIDER NOTES
[facility] ED     Emergency Department     Faculty Attestation    I performed a history and physical examination of the patient and discussed management with the resident. I reviewed the resident’s note and agree with the documented findings and plan of care. Any areas of disagreement are noted on the chart. I was personally present for the key portions of any procedures. I have documented in the chart those procedures where I was not present during the key portions. I have reviewed the emergency nurses triage note. I agree with the chief complaint, past medical history, past surgical history, allergies, medications, social and family history as documented unless otherwise noted below. For Physician Assistant/ Nurse Practitioner cases/documentation I have personally evaluated this patient and have completed at least one if not all key elements of the E/M (history, physical exam, and MDM). Additional findings are as noted.    3:05 PM EDT    Patient presents requesting STD check.  She says that her partner was exposed to chlamydia.  She says she has been having some abnormal discharge and a tingling sensation in her vulvar area for the past week.  She denies any abdominal pain.  She denies any fever, chills, nausea, vomiting, dysuria.  She says that she does not have regular periods due to having the Nexplanon.  On exam, patient is resting comfortably in the bed and appears well.  Abdomen is soft and nontender.  Will await labs.      Charlotte Greer MD  Attending Emergency  Physician

## 2024-11-02 NOTE — DISCHARGE INSTRUCTIONS
You were seen today in the emergency department for your concerns about sexually transmitted infections.  We have evaluated you and determined that you likely have bacterial vaginitis, please follow-up MyChart for the results of chlamydia and gonorrhea.  We have empirically treated you with Rocephin injection, we are also prescribing you doxycycline and metronidazole, please read and review the prescription.  I have also provided you with a follow-up with OB/GYN for the lesion that was noticed on the pelvic exam, please make an appointment in the next 24 to 48 hours.  Please return to the emergency department immediately if develop any new or worsening concerns including chest pain, shortness of breath, abdominal pain, nausea, vomiting, diarrhea, weakness, loss consciousness, fever, chills, worsening vaginal pain, worsening vaginal discharge, or any other concerns.    Please call your PCP and schedule appointment within the next 24 to 48 hours for follow-up.

## 2024-11-02 NOTE — ED NOTES
Pt arrived to ED alert and oriented x4 via triage.  Pt c/o vaginal discharge s/p exposure to STD.  Pt reports that her boyfriend was sleeping with another female who has Chlamydia.  Pt reports thick, white vaginal discharge with an odor, states tingling when she urinates.  Pt denies pain currently.  Pt denies having been around anyone suspected to have COVID-19 or anyone that has been sick, denies recent travel outside the state of [place] or .  RR even and unlabored.   NAD noted.   Whiteboard updated.  Will continue with plan of care.

## 2024-11-04 LAB
C TRACH DNA SPEC QL PROBE+SIG AMP: NEGATIVE
N GONORRHOEA DNA SPEC QL PROBE+SIG AMP: NEGATIVE
SPECIMEN DESCRIPTION: NORMAL

## 2025-03-23 ENCOUNTER — HOSPITAL ENCOUNTER (EMERGENCY)
Age: 20
Discharge: LEFT BEFORE DISCHARGE | End: 2025-03-23
Attending: EMERGENCY MEDICINE
Payer: COMMERCIAL

## 2025-03-23 VITALS
RESPIRATION RATE: 18 BRPM | SYSTOLIC BLOOD PRESSURE: 123 MMHG | OXYGEN SATURATION: 100 % | DIASTOLIC BLOOD PRESSURE: 87 MMHG | TEMPERATURE: 98.2 F | HEART RATE: 102 BPM

## 2025-03-23 DIAGNOSIS — B96.89 BV (BACTERIAL VAGINOSIS): Primary | ICD-10-CM

## 2025-03-23 DIAGNOSIS — N76.0 BV (BACTERIAL VAGINOSIS): Primary | ICD-10-CM

## 2025-03-23 LAB
CANDIDA SPECIES: NEGATIVE
GARDNERELLA VAGINALIS: POSITIVE
HCG SERPL QL: NEGATIVE
HIV 1+2 AB+HIV1 P24 AG SERPL QL IA: NONREACTIVE
SOURCE: ABNORMAL
T PALLIDUM AB SER QL IA: NONREACTIVE
TRICHOMONAS: NEGATIVE

## 2025-03-23 PROCEDURE — 87491 CHLMYD TRACH DNA AMP PROBE: CPT

## 2025-03-23 PROCEDURE — 87591 N.GONORRHOEAE DNA AMP PROB: CPT

## 2025-03-23 PROCEDURE — 86780 TREPONEMA PALLIDUM: CPT

## 2025-03-23 PROCEDURE — 87529 HSV DNA AMP PROBE: CPT

## 2025-03-23 PROCEDURE — 87480 CANDIDA DNA DIR PROBE: CPT

## 2025-03-23 PROCEDURE — 99283 EMERGENCY DEPT VISIT LOW MDM: CPT | Performed by: EMERGENCY MEDICINE

## 2025-03-23 PROCEDURE — 87660 TRICHOMONAS VAGIN DIR PROBE: CPT

## 2025-03-23 PROCEDURE — 87510 GARDNER VAG DNA DIR PROBE: CPT

## 2025-03-23 PROCEDURE — 87389 HIV-1 AG W/HIV-1&-2 AB AG IA: CPT

## 2025-03-23 PROCEDURE — 84703 CHORIONIC GONADOTROPIN ASSAY: CPT

## 2025-03-23 RX ORDER — METRONIDAZOLE 500 MG/1
500 TABLET ORAL ONCE
Status: DISCONTINUED | OUTPATIENT
Start: 2025-03-23 | End: 2025-03-23 | Stop reason: HOSPADM

## 2025-03-23 RX ORDER — METRONIDAZOLE 500 MG/1
500 TABLET ORAL 2 TIMES DAILY
Qty: 14 TABLET | Refills: 0 | Status: SHIPPED | OUTPATIENT
Start: 2025-03-23 | End: 2025-03-30

## 2025-03-23 NOTE — DISCHARGE INSTRUCTIONS
You are seen in the ED vaginal discharge.  You tested positive for bacterial vaginosis.    Please take metronidazole 500 Mg twice a day for the next 7 days.    Please follow-up with the gonorrhea, chlamydia, HIV and [facility].    Please return to the ED if experience any worsening of discharge, abdominal pain, fever/chills or no improvement symptoms

## 2025-03-23 NOTE — ED PROVIDER NOTES
[facility]     Emergency Department     Faculty Attestation    I performed a history and physical examination of the patient and discussed management with the resident. I reviewed the resident's note and agree with the documented findings and plan of care. Any areas of disagreement are noted on the chart. I was personally present for the key portions of any procedures. I have documented in the chart those procedures where I was not present during the key portions. I have reviewed the emergency nurses triage note. I agree with the chief complaint, past medical history, past surgical history, allergies, medications, social and family history as documented unless otherwise noted below. For Physician Assistant/ Nurse Practitioner cases/documentation I have personally evaluated this patient and have completed at least one if not all key elements of the E/M (history, physical exam, and MDM). Additional findings are as noted.      Abdomen soft and nontender, no herpes lesions were seen during the resident exam.  Patient wants to be checked for herpes because she states that her boyfriend cheated on someone who had HSV 1.     Paulino Masters MD  03/23/25 1007

## 2025-03-23 NOTE — ED NOTES
Pt to ed from home c/o vaginal discharge with mild odor, no itching. Denies dysuria, pain, hematuria. Patient is aox4, non labored RR. Reports wanting tested for \"everything\" concern for exposure to std.

## 2025-03-23 NOTE — ED PROVIDER NOTES
[facility] EMERGENCY DEPARTMENT  Emergency Department Encounter  Emergency Medicine Resident     Pt Name:Karen Heard  MRN: [id number]  Birthdate 2005  Date of evaluation: 3/23/25  PCP:  No primary care provider on file.  Note Started: 10:49 AM EDT      CHIEF COMPLAINT       Chief Complaint   Patient presents with    Vaginal Discharge       HISTORY OF PRESENT ILLNESS  (Location/Symptom, Timing/Onset, Context/Setting, Quality, Duration, Modifying Factors, Severity.)      Karen Heard is a 19 y.o. female who presents with vaginal discharge requesting to be evaluated for STD exposure.  Patient states that she has recent contact with an individual who was further in contact with someone confirmed to be exhibiting HSV 1.  Patient states that she has noticed some whitish discharge with an abnormal odor.  Denies any vaginal bleeding, fever/chills, abdominal pain, chest pain, shortness of breath or emesis.    PAST MEDICAL / SURGICAL / SOCIAL / FAMILY HISTORY      has a past medical history of Allergic, Chlamydia, Chronic allergic rhinitis, Failed hearing screening, Gonorrhea, Hypertension, Mild tetrahydrocannabinol (THC) abuse, Pyelonephritis,  19 M Apg  5#7, THC use , and THC use .       has no past surgical history on file.      Social History     Socioeconomic History    Marital status: Single     Spouse name: Not on file    Number of children: Not on file    Years of education: Not on file    Highest education level: Not on file   Occupational History    Not on file   Tobacco Use    Smoking status: Former     Types: Cigars     Quit date: 8/15/2022     Years since quittin.6     Passive exposure: Never    Smokeless tobacco: Never    Tobacco comments:     Patient counseled on maternal/fetal risk factors.  Pt verbalizes understanding     Vaping Use    Vaping status: Former    Quit date: 2021    Substances: Nicotine, Flavoring   Substance and Sexual Activity    Alcohol use: Not

## 2025-03-24 LAB
C TRACH DNA SPEC QL PROBE+SIG AMP: NEGATIVE
HSV1 DNA SPEC QL NAA+PROBE: NEGATIVE
HSV2 DNA SPEC QL NAA+PROBE: NEGATIVE
N GONORRHOEA DNA SPEC QL PROBE+SIG AMP: NEGATIVE
SPECIMEN DESCRIPTION: NORMAL
SPECIMEN DESCRIPTION: NORMAL

## 2025-05-12 ENCOUNTER — HOSPITAL ENCOUNTER (EMERGENCY)
Age: 20
Discharge: HOME | End: 2025-05-12
Attending: EMERGENCY MEDICINE
Payer: COMMERCIAL

## 2025-05-12 ENCOUNTER — APPOINTMENT (OUTPATIENT)
Dept: GENERAL RADIOLOGY | Age: 20
End: 2025-05-12
Payer: COMMERCIAL

## 2025-05-12 VITALS
RESPIRATION RATE: 16 BRPM | OXYGEN SATURATION: 99 % | HEART RATE: 97 BPM | TEMPERATURE: 98.6 F | DIASTOLIC BLOOD PRESSURE: 75 MMHG | SYSTOLIC BLOOD PRESSURE: 137 MMHG

## 2025-05-12 DIAGNOSIS — S93.402A SPRAIN OF LEFT ANKLE, UNSPECIFIED LIGAMENT, INITIAL ENCOUNTER: Primary | ICD-10-CM

## 2025-05-12 PROCEDURE — 73630 X-RAY EXAM OF FOOT: CPT

## 2025-05-12 PROCEDURE — 99283 EMERGENCY DEPT VISIT LOW MDM: CPT

## 2025-05-12 PROCEDURE — 73610 X-RAY EXAM OF ANKLE: CPT

## 2025-05-12 RX ORDER — IBUPROFEN 600 MG/1
600 TABLET, FILM COATED ORAL 3 TIMES DAILY PRN
Qty: 15 TABLET | Refills: 0 | Status: SHIPPED | OUTPATIENT
Start: 2025-05-12

## 2025-05-12 RX ORDER — ACETAMINOPHEN 500 MG
1000 TABLET ORAL 3 TIMES DAILY PRN
Qty: 30 TABLET | Refills: 0 | Status: SHIPPED | OUTPATIENT
Start: 2025-05-12

## 2025-05-12 ASSESSMENT — ENCOUNTER SYMPTOMS
SHORTNESS OF BREATH: 0
COUGH: 0
NAUSEA: 0
ABDOMINAL PAIN: 0
VOMITING: 0

## 2025-05-12 ASSESSMENT — LIFESTYLE VARIABLES
HOW OFTEN DO YOU HAVE A DRINK CONTAINING ALCOHOL: NEVER
HOW MANY STANDARD DRINKS CONTAINING ALCOHOL DO YOU HAVE ON A TYPICAL DAY: PATIENT DOES NOT DRINK

## 2025-05-12 NOTE — ED NOTES
Pt is A+Ox4  Pt complains of left ankle pain due to a fall  Pt denies hitting head  Pt denies loss of consciousness  Pt to 29[address] with a wheelchair  Pt states she is unable to bear weight to the left ankle  Family at the bedside  All questions answered and needs met at this time  Whiteboard updated

## 2025-05-12 NOTE — ED PROVIDER NOTES
[facility] EMERGENCY DEPARTMENT     Emergency Department     Faculty Attestation        I performed a history and physical examination of the patient and discussed management with the resident. I reviewed the resident’s note and agree with the documented findings and plan of care. Any areas of disagreement are noted on the chart. I was personally present for the key portions of any procedures. I have documented in the chart those procedures where I was not present during the key portions. I have reviewed the emergency nurses triage note. I agree with the chief complaint, past medical history, past surgical history, allergies, medications, social and family history as documented unless otherwise noted below.    For mid-level providers such as nurse practitioners as well as physicians assistants:    I have personally seen and evaluated the patient.    I find the patient's history and physical exam are consistent with NP/PA documentation.  I agree with the care provided, treatment rendered, disposition, & follow-up plan.     Additional findings are as noted.    Vital Signs: /75   Pulse 97   Temp 98.6 °F (37 °C)   Resp 16   SpO2 99%   PCP:  No primary care provider on file.    Pertinent Comments:     Patient inverted her left ankle she has pain at the base of the fifth metatarsal and left lateral malleolus is no proximal tibia or fibular tenderness.      Critical Care  None          Wyatt Hernandes MD    Attending Emergency Medicine Physician          Harshil Hernandes MD  05/12/25 [id number]    
cardiologist.    EMERGENCY DEPARTMENT COURSE:  Patient seen and examined.  Vital signs within normal limits.  Patient is overall well-appearing, not in acute distress, nontoxic-appearing.  Will obtain x-rays of the left ankle and foot to further evaluate.    ED Course as of 05/12/25 1526   Mon May 12, 2025   1520 XR ANKLE LEFT (MIN 3 VIEWS)  \"IMPRESSION:  LEFT ANKLE:     No acute fracture or dislocation.     LEFT FOOT:     No acute fracture or dislocation.\" [CH]   1526 X-rays negative for acute concerns, will treat as ankle sprain.  Will place in low top air cast walker boot.  Will provide prescriptions for ibuprofen and acetaminophen.  Will refer to orthopedic surgery for follow-up, patient also to follow-up with PCP, back ED for any acute concerns. [CH]      ED Course User Index  [CH] Wei Lewis MD       PROCEDURES:      CONSULTS:  None    CRITICAL CARE:  There was significant risk of life threatening deterioration of patient's condition requiring my direct management. Critical care time 0 minutes, excluding any documented procedures.    FINAL IMPRESSION      1. Sprain of left ankle, unspecified ligament, initial encounter          DISPOSITION / PLAN     DISPOSITION Decision To Discharge 05/12/2025 03:24:59 PM   DISPOSITION CONDITION Stable           PATIENT REFERRED TO:  No follow-up provider specified.    DISCHARGE MEDICATIONS:  New Prescriptions    ACETAMINOPHEN (TYLENOL) 500 MG TABLET    Take 2 tablets by mouth 3 times daily as needed for Pain    IBUPROFEN (ADVIL;MOTRIN) 600 MG TABLET    Take 1 tablet by mouth 3 times daily as needed for Pain       Wei Lewis MD  Emergency Medicine Resident    (Please note that portions of thisnote were completed with a voice recognition program.  Efforts were made to edit the dictations but occasionally words are mis-transcribed.)

## 2025-05-12 NOTE — DISCHARGE INSTRUCTIONS
Be sure to follow up with your primary care provider.  You have been referred to an orthopedic surgeon for follow-up as well, call listed number to schedule an appointment for follow-up.  Return to emergency department for any acutely worsening/changing symptoms or any other acute concerns.

## 2025-06-22 ENCOUNTER — HOSPITAL ENCOUNTER (EMERGENCY)
Age: 20
Discharge: HOME | End: 2025-06-22

## 2025-06-22 VITALS
TEMPERATURE: 98.2 F | DIASTOLIC BLOOD PRESSURE: 66 MMHG | SYSTOLIC BLOOD PRESSURE: 113 MMHG | OXYGEN SATURATION: 97 % | HEART RATE: 114 BPM | RESPIRATION RATE: 16 BRPM

## 2025-06-22 ASSESSMENT — PAIN SCALES - GENERAL: PAINLEVEL_OUTOF10: 10

## 2025-06-22 ASSESSMENT — PAIN DESCRIPTION - LOCATION: LOCATION: HEAD

## 2025-06-22 ASSESSMENT — PAIN - FUNCTIONAL ASSESSMENT: PAIN_FUNCTIONAL_ASSESSMENT: 0-10

## 2025-06-22 ASSESSMENT — PAIN DESCRIPTION - DESCRIPTORS: DESCRIPTORS: ACHING
